# Patient Record
Sex: MALE | Race: WHITE | NOT HISPANIC OR LATINO | Employment: FULL TIME | URBAN - NONMETROPOLITAN AREA
[De-identification: names, ages, dates, MRNs, and addresses within clinical notes are randomized per-mention and may not be internally consistent; named-entity substitution may affect disease eponyms.]

---

## 2018-01-23 ENCOUNTER — OFFICE VISIT CONVERTED (OUTPATIENT)
Dept: FAMILY MEDICINE CLINIC | Age: 28
End: 2018-01-23
Attending: NURSE PRACTITIONER

## 2018-08-02 ENCOUNTER — OFFICE VISIT CONVERTED (OUTPATIENT)
Dept: FAMILY MEDICINE CLINIC | Age: 28
End: 2018-08-02
Attending: NURSE PRACTITIONER

## 2021-05-18 NOTE — PROGRESS NOTES
Ignacio Ceja 1990     Office/Outpatient Visit    Visit Date: Thu, Aug 2, 2018 02:17 pm    Provider: Melissa Perez N.P. (Assistant: Fatoumata Monahan LPN)    Location: [facility]        Electronically signed by Melissa Perez N.P. on  2018 02:43:08 PM                             SUBJECTIVE:        CC:     Jose is a 28 year old White male.  productive cough with clear/white sputum for about 1 month         HPI:         Jose presents with upper respiratory illness.  These have been present for the past 4 weeks.  The symptoms include cough and post nasal drip.  He denies fever.  He has already tried to relieve the symptoms with decongestants ( Sudafed ), Robitussin, and Amoxicillin called in by telehealth.      ROS:     CONSTITUTIONAL:  Negative for chills and fever.      EYES:  Negative for blurred vision and eye drainage.      E/N/T:  Positive for post nasal drip.   Negative for ear pain or sore throat.      CARDIOVASCULAR:  Negative for chest pain and palpitations.      RESPIRATORY:  Positive for recent cough.   Negative for dyspnea or frequent wheezing.          PMH/FMH/SH:     Last Reviewed on 2018 03:55 PM by Ruba Pelaez    Past Medical History:             PAST MEDICAL HISTORY     UNREMARKABLE         Surgical History:     NONE         Family History:     Father: Healthy     Mother: Healthy     Sister(s): Healthy; 1 sister(s) total     Paternal Grandfather: Healthy     Paternal Grandmother:      Maternal Grandfather:      Maternal Grandmother:          Social History:     Occupation: [employer]      Marital Status: Single     Children: None         Tobacco/Alcohol/Supplements:     Last Reviewed on 2018 03:49 PM by Leona Wallace    Tobacco: He has never smoked.              Current Problems:     Attention deficit disorder, without hyperactivity         Immunizations:     None        Allergies:     Last Reviewed on 2018 02:20 PM by  Fatoumata Monahan April      No Known Drug Allergies.         Current Medications:     Last Reviewed on 8/02/2018 02:20 PM by Fatoumata Monahan April    None        OBJECTIVE:        Vitals:         Current: 8/2/2018 2:19:54 PM    Ht:  5 ft, 6.75 in;  Wt: 149.4 lbs;  BMI: 23.6    T: 97.5 F (oral);  BP: 100/70 mm Hg (left arm, sitting);  P: 59 bpm (left arm (BP Cuff), sitting)    O2 Sat: 99 % (room air)        Exams:     PHYSICAL EXAM:     GENERAL: well developed, well nourished;  no apparent distress;     EYES: PERRL, EOMI     E/N/T: EARS: external auditory canal normal;  bilateral TMs are normal;  NOSE: normal turbinates; no sinus tenderness; OROPHARYNX: oral mucosa is normal; posterior pharynx shows no exudate and post nasal drip;     NECK: range of motion is normal; trachea is midline;     RESPIRATORY: normal respiratory rate and pattern with no distress; normal breath sounds with no rales, rhonchi, wheezes or rubs;     CARDIOVASCULAR: normal rate; rhythm is regular;     MUSCULOSKELETAL: normal gait;     NEUROLOGIC: mental status: alert and oriented x 3; GROSSLY INTACT     PSYCHIATRIC: appropriate affect and demeanor;         ASSESSMENT           466.0   J20.9  Acute bronchitis              DDx:         ORDERS:         Meds Prescribed:       Prednisone 10mg Tablet take 6 pills today, 5 pills tomorrow, 4 pills day 3, 3 pills day 4, 2 pills day 5 and 1 pill day 6  #21 (Twenty One) tablet(s) Refills: 0       Bromfed-DM (Brompheniramine/Dextromethorphan/Pseudoephedrine) Syrup 1  to 2  tsp po every 4-6 hrs prn cough/congestion.  #240 ([address] [place] [address] [place]) ml Refills: 0                 PLAN:          Acute bronchitis         RECOMMENDATIONS given include: Push Fluids, Rest, Follow up if no improvement or worsening symptoms like high fevers, vomiting, weakness, or increasing shortness of air.    .      FOLLOW-UP: Schedule follow-up appointments on a p.r.n. basis. Chronic visit follow up           Prescriptions: Will  order Tessalon perles if Bromfed DM too expensive. Pt will call back.       Prednisone 10mg Tablet take 6 pills today, 5 pills tomorrow, 4 pills day 3, 3 pills day 4, 2 pills day 5 and 1 pill day 6  #21 (Twenty One) tablet(s) Refills: 0       Bromfed-DM (Brompheniramine/Dextromethorphan/Pseudoephedrine) Syrup 1  to 2  tsp po every 4-6 hrs prn cough/congestion.  #240 ([address] [place] [address]) ml Refills: 0           Patient Education Handouts:       Acute Bronchitis              Patient Recommendations:        For  Acute bronchitis:     Schedule follow-up appointments as needed.              CHARGE CAPTURE           **Please note: ICD descriptions below are intended for billing purposes only and may not represent clinical diagnoses**        Primary Diagnosis:         466.0 Acute bronchitis            J20.9    Acute bronchitis, unspecified              Orders:          76217   Office/outpatient visit; established patient, level 3  (In-House)

## 2021-05-18 NOTE — PROGRESS NOTES
"Ignacio Ceja 1990     Office/Outpatient Visit    Visit Date: Tue, Jan 23, 2018 03:45 pm    Provider: Ruba Pelaez N.P. (Assistant: Leona Wallace MA)    Location: [facility]        Electronically signed by Ruba Pelaez N.P. on  01/23/2018 04:53:37 PM                             SUBJECTIVE:        CC:     Mr. Ceja is a 27 year old White male.  This is his first visit to the clinic.  EST.CARE, [facility]         HPI:         HEALTH RISK PROFILE (\"At Risk\" items are starred):         Smoking: Life-long non-smoker;     Cancer Screening: Performs regular testicular exams;     Immunization Status: unknown;     Nutrition: does not follow any type of diet;     Physical Activity: ** Never exercises;     Alcohol/Drug Use: Rarely drinks alcohol; No illicit drug use;     Safety: Always wears seatbelt;     ROS:     CONSTITUTIONAL:  Negative for chills, fatigue, fever, and weight change.      EYES:  Positive for decreased vision at times with distance and near.      E/N/T:  Negative for hearing problems, E/N/T pain, congestion, rhinorrhea, epistaxis, hoarseness, and dental problems.      CARDIOVASCULAR:  Negative for chest pain, palpitations, tachycardia, orthopnea, and edema.      RESPIRATORY:  Negative for cough, dyspnea, and hemoptysis.      GASTROINTESTINAL:  Negative for abdominal pain, heartburn, constipation, diarrhea, and stool changes.      GENITOURINARY:  Negative for dysuria, genital lesions, hematuria, impotence, polyuria, and changes in urine stream.      MUSCULOSKELETAL:  Negative for arthralgias, back pain, and myalgias.      INTEGUMENTARY:  Negative for atypical moles, dry skin, pruritis, and rashes.      NEUROLOGICAL:  Negative for dizziness, headaches, paresthesias, and weakness.      HEMATOLOGIC/LYMPHATIC:  Negative for easy bruising, bleeding, and lymphadenopathy.      ENDOCRINE:  Negative for hair loss, heat/cold intolerance, polydipsia, and polyphagia.      " ALLERGIC/IMMUNOLOGIC:  Negative for allergies, frequent illnesses, HIV exposure, and urticaria.      PSYCHIATRIC:  Negative for anxiety, depression, and sleep disturbances.          PMH/FMH/SH:     Last Reviewed on 2018 03:55 PM by Ruba Pelaez    Past Medical History:             PAST MEDICAL HISTORY     UNREMARKABLE         Surgical History:     NONE         Family History:     Father: Healthy     Mother: Healthy     Sister(s): Healthy; 1 sister(s) total     Paternal Grandfather: Healthy     Paternal Grandmother:      Maternal Grandfather:      Maternal Grandmother:          Social History:     Occupation: [place]      Marital Status: Single     Children: None         Tobacco/Alcohol/Supplements:     Last Reviewed on 2018 03:49 PM by Leona Wallace    Tobacco: He has never smoked.              Immunizations:     None        Allergies:     Last Reviewed on 2018 03:48 PM by Leona Wallace      No Known Drug Allergies.         Current Medications:     Last Reviewed on 2018 03:49 PM by Leona Wallace    None        OBJECTIVE:        Vitals:         Current: 2018 3:47:56 PM    Ht:  5 ft, 6.75 in;  Wt: 151.4 lbs;  BMI: 23.9    T: 97.5 F (oral);  BP: 126/72 mm Hg (left arm, sitting);  P: 74 bpm (left arm (BP Cuff), sitting)        Exams:     PHYSICAL EXAM:     GENERAL: Vitals recorded well developed, well nourished;  well groomed;  no apparent distress;     EYES: lids and lacrimal system are normal in appearance; extraocular movements intact; conjunctiva and cornea are normal; PERRLA;     E/N/T:  normal EACs, TMs, nasal/oral mucosa, teeth, gingiva, and oropharynx;     NECK:  supple, full ROM; no thyromegaly; no carotid bruits;     RESPIRATORY: normal respiratory rate and pattern with no distress; normal breath sounds with no rales, rhonchi, wheezes or rubs;     CARDIOVASCULAR: normal rate; rhythm is regular;  no systolic murmur; no edema;      GASTROINTESTINAL: nontender, nondistended; no hepatosplenomegaly or masses; no bruits;     LYMPHATIC: no enlargement of cervical or facial nodes;     SKIN:  no significant rashes or lesions; no suspicious moles;     MUSCULOSKELETAL:  Normal range of motion, strength and tone;     NEUROLOGIC: GROSSLY INTACT     PSYCHIATRIC:  appropriate affect and demeanor; normal speech pattern; grossly normal memory;         Lab/Test Results:     AUDIO AND VISUAL SCREENING     Vision Testing: Near Right 20/20 Left 20/20 Bilateral 20/20;  Far Right 20/15 Left 20/15 Bilateral 20/13;         ASSESSMENT           V70.0   Z00.00  Annual exam              DDx:         ORDERS:         Procedures Ordered:       [id number]  Screening test of visual acuity, quantitative, bilateral  (In-House)                   PLAN:          Annual exam reviewed labs with patient and to work on his exercise and diet in regards to his low HDL         COUNSELING was provided today regarding the following topics: healthy eating habits, regular exercise, testicular self-exam, use of seat belts, and ADVISED TO SEE AN EYE DOCTOR AND A DENTIST REGULARLY.      FOLLOW-UP: Schedule follow-up appointments on a p.r.n. basis.            Orders:       [id number]  Screening test of visual acuity, quantitative, bilateral  (In-House)             Patient Education Handouts:       Physical Exam 20-29 year, Male              Patient Recommendations:        For  Annual exam:     Limit dietary intake of fat (especially saturated fat) and cholesterol.  Eat a variety of foods, including plenty of fruits, vegetables, and grain containg fiber, limit fat intake to 30% of total calories. Balance caloric intake with energy expended. Maintaining regular physical activity is advised to help prevent heart disease, hypertension, diabetes, and obesity.    Testicular cancer is the #1 cancer in men ages 15-35.  You should regularly examine your testicles for knots, lumps, or tenderness. Testicular pain,  even if not associated with any masses, needs to be evaluated by your doctor! Always use shoulder/lap restraints when driving or riding in a vehicle, even those equipped with air bags.  Schedule follow-up appointments as needed.              CHARGE CAPTURE           **Please note: ICD descriptions below are intended for billing purposes only and may not represent clinical diagnoses**        Primary Diagnosis:         V70.0 Annual exam            Z00.00    Encounter for general adult medical examination without abnormal findings              Orders:          [id number]   Preventive medicine, new patient, age 18-39 years  (In-House)             15892   Screening test of visual acuity, quantitative, bilateral  (In-House)

## 2021-07-01 VITALS
HEART RATE: 59 BPM | TEMPERATURE: 97.5 F | SYSTOLIC BLOOD PRESSURE: 100 MMHG | HEIGHT: 67 IN | WEIGHT: 149.4 LBS | DIASTOLIC BLOOD PRESSURE: 70 MMHG | BODY MASS INDEX: 23.45 KG/M2 | OXYGEN SATURATION: 99 %

## 2021-07-01 VITALS
HEART RATE: 74 BPM | TEMPERATURE: 97.5 F | HEIGHT: 67 IN | DIASTOLIC BLOOD PRESSURE: 72 MMHG | SYSTOLIC BLOOD PRESSURE: 126 MMHG | BODY MASS INDEX: 23.76 KG/M2 | WEIGHT: 151.4 LBS

## 2021-11-25 ENCOUNTER — E-VISIT (OUTPATIENT)
Dept: FAMILY MEDICINE CLINIC | Facility: TELEHEALTH | Age: 31
End: 2021-11-25

## 2021-11-26 NOTE — E-VISIT ESCALATED
Patient escalated   Provider Sonali Cortes chose to escalate patient to another level of care because: Insufficient information to diagnose   Patient was sent the following message:   Based on the information you've provided us, you need to take another step to get care. Please sign up for a video visit tomorrow. I need more information to better decide what course of action to take. We do not start new psychological mental health medication thru virtual care. i am not sure why your psychiatrist can't prescribe this for you. If you do a video visit tomorrow we can discuss your options. Sonali FRAIRE   What to do now:    Please set up a video visit  .   You won't be charged for your eVisit. If you paid with a credit card, the charge will be reversed.   Chief Complaint: Anxiety, Depression, Stress   Patient introduction   Patient is 31-year-old male presenting with mood symptoms. Patient reports experiencing current symptoms for less than a year. Patient is willing to try medication as part of their treatment plan.   Patient-submitted comments explaining reason for visit: I've been seeing a therapist for a few weeks now and he says I need to try Wellbutrin due to my depression, seasonal depression, minor anxiety, and current difficulty concentrating on homework and other tasks(I have a past diagnosis of ADHD, I've been off my adderal for over 15 years)..   Patient did not request an excuse note.   Reports recent unusual stress relating to personal relationships and work.   Depression screening   PHQ-9. Response options are: Not at all (0), On several days (1), More than half the days (2), or Nearly every day (3).   Over the past 2 weeks, patient has been bothered:    (3) Nearly every day by having little interest or pleasure in doing things    (3) Nearly every day by depressed mood    (2) On more than half the days by sleep disturbance    (2) On more than half the days by fatigue or lethargy    (2) On more  than half the days by change in appetite    (0) Not at all by feelings of worthlessness or excessive guilt    (3) Nearly every day by poor concentration    (0) Not at all by observable restlessness or slowness in movement    (0) Not at all by thoughts of hurting themselves or that they'd be better off dead   Reports that the above problems have made it very difficult to work, function at home, or get along with other people.   Score: 15. Interpretation: 0-4: None to minimal. 5-9: Mild depression. 10-14: Major Depressive Disorder, Mild. 15-19: Major Depressive Disorder, Moderately Severe. 20-27: Major Depressive Disorder, Severe.   Anxiety screening   ANA-7. Response options are: Not at all (0), On several days (1), More than half the days (2), or Nearly every day (3)   Over the past 2 weeks, patient has been bothered:    (2) On more than half the days by feeling nervous, anxious, or on edge    (2) On more than half the days by not being able to stop or control worrying    (2) On more than half the days by worrying too much about different things    (2) On more than half the days by having trouble relaxing    (0) Not at all by being so restless that it's hard to sit still    (0) Not at all by becoming easily annoyed or irritable    (1) On several days by feeling afraid, as if something awful might happen   Reports that the above problems have made it somewhat difficult to work, function at home, or get along with other people.   Score: 9. Interpretation: 0-4: None to minimal. 5-9: Mild anxiety. 10-14: Moderate anxiety. 15-21: Severe anxiety.   Suicide risk screening   Score: Negative screen (based on PHQ-9 responses above).   Action taken based on risk:    Negative screen: Patient completed interview.    Low risk: Patient completed interview. Follow-up per provider discretion.    Moderate risk: Recommended to call the National Suicide Prevention Lifeline, 911, or go to their nearest ER. Patient given option to  continue with the interview if those options are not relevant at this time. Follow-up per provider discretion.    High risk: Recommended to go to ER, call 911, or call the National Suicide Prevention Lifeline. Patient given option to continue with the interview if those options are not relevant at this time.   Repetitive thoughts and behaviors screening   DSM-5 Level 1 Cross-Cutting Symptom Measure, Section X. 2 items. Response options are: Not at all (0), Rarely (1), Several days (2), More than half the days (3), or Nearly every day (4)   Over the past 2 weeks, patient has been bothered:    (0) Not at all by unpleasant thoughts, urges, or images that repeatedly enter their mind    (0) Not at all by feeling driven to repeat certain behaviors or mental acts   Score: 0. Interpretation: 0-2 (with 0-1 on both items): Negative screen. >= 2 (with >= 2 on either item): Positive screen.   Janette/hypomania screening   DSM-5 Level 1 Cross-Cutting Symptom Measure, Section III. 2 items. Response options are: Not at all (0), Rarely (1), Several days (2), More than half the days (3), or Nearly every day (4)   Over the past 2 weeks, patient has been bothered:    (0) Not at all by sleeping less than usual, but still having a lot of energy    (0) Not at all by starting lots more projects than usual or doing more risky things than usual   Score: 0. Interpretation: 0-2 (with <= 1 on both items): Negative screen. >= 2 (with >= 2 on at least 1 item): Positive screen; in-interview follow-up with Jannette Self-Rating Janette (ASRM) Scale.   Psychosis/hallucination screening   DSM-5 Level 1 Cross-Cutting Symptom Measure, Section VII. 2 items. Response options are: Not at all (0), Rarely (1), Several days (2), More than half the days (3), or Nearly every day (4)   Over the past 2 weeks, patient has been bothered:    (0) Not at all by hearing things other people couldn't hear    (0) Not at all by feeling that someone could hear their thoughts    Score: 0. Interpretation: 0: Negative screen. 1 or higher: Positive screen.   Substance abuse screening   DSM-5 Level 1 Cross-Cutting Symptom Measure, Section XIII. 3 items on use of alcohol, tobacco, recreational drugs, or prescription medications beyond the amount prescribed or duration of prescription.   Over the past 2 weeks, patient:    (0) Denies having at least 4 alcoholic drinks on any single day    (0) Denies using tobacco    (0) Denies using a recreational or prescription drug on their own   Score: 0. Interpretation: 0 is a negative screen. 1 or higher with positive response for prescription/recreational drug abuse leads to follow-up with Level 2 Cross-Cutting Symptom Measure, Section XIII. 1 or higher with positive response for alcohol leads to follow-up with AUDIT-C. 1 or higher with positive response for tobacco use leads to tobacco cessation advice in AVS.   Comorbid/Exacerbating conditions   Denies history of asthma, cancer, chronic pain, congestive heart failure, coronary artery disease, diabetes, epilepsy, hypertension, inflammatory arthritis, kidney disease or history of kindey function problems, lupus, multiple sclerosis, Parkinson disease, thyroid disorder, and viral hepatitis.   Past mental health history   Reports previous diagnosis of depression. Regarding previous diagnosis of mental health condition(s) not listed, patient writes: ADHD?.   Family history of mental health disorders   Reports family history of generalized anxiety disorder.   Current mental health treatment   Patient is not currently taking medication for any mental health condition. Patient is currently in counseling or therapy. Patient is currently being seen by a psychiatrist.   Previous mental health treatment   Patient has not taken medication for any mental health condition in the past.   Current medications   Denies taking other medications or supplements.   Medication allergies   None.   Medication contraindications    None.   Assessment:   Patient determined to need a level of care not appropriate to be delivered through eVisit.   Plan:   Patient informed of need to seek in-person care      ----------   Electronically signed by JUNO Huynh on 2021-11-25 at 20:41PM   ----------   Patient Interview Transcript:   Have you recently experienced unusual stress from any of these? Select all that apply.    Personal relationships    Work   Not selected:    Home situation    Family    Finances    Something related to COVID-19    Current news and events    None of the above   Have you ever been diagnosed with any of these mental health conditions? Select all that apply.    Depression    A mental health condition not listed here (specify): ADHD?   Not selected:    Generalized anxiety disorder (ANA)    Panic attacks    Post traumatic stress disorder (PTSD)    Obsessive-compulsive disorder (OCD)    Bipolar disorder    Schizophrenia or schizoaffective disorder    None of the above   Are you currently taking medication for any mental health condition? Select one.    No   Not selected:    Yes   Have you taken medication for any mental health condition in the past? Select one.    No   Not selected:    Yes   Are you currently in counseling or therapy? Select one.    Yes   Not selected:    No   Are you currently being seen by a psychiatrist, or have you been seen by a psychiatrist in the last 2 years? Select one.    Yes, currently   Not selected:    Yes, within the last 2 years    No   Do you have any of these medical conditions? Scroll to see all options. Select all that apply.    None of the above   Not selected:    Asthma    Cancer    Chronic pain    Congestive heart failure    Coronary artery disease (blocked arteries in the heart)    Diabetes    Epilepsy    High blood pressure    Inflammatory arthritis    Kidney disease or history of kidney function problems    Lupus (SLE)    Multiple sclerosis    Parkinson disease    Thyroid disorder     Viral hepatitis   Has anyone in your family had any of these? Select all that apply.    Generalized anxiety disorder (ANA)   Not selected:    Depression    Panic attacks    Post traumatic stress disorder (PTSD)    Obsessive-compulsive disorder (OCD)    Bipolar disorder    Schizophrenia or schizoaffective disorder    Drug or alcohol addiction (substance use disorder)     by suicide    Attempted suicide    No, not that I know of   1. Over the past 2 weeks, how often have you been bothered by: Having little interest or pleasure in doing things Select one.    Nearly every day   Not selected:    Not at all    Several days    More than half the days   2. Over the past 2 weeks, how often have you been bothered by: Feeling down, depressed, or hopeless Select one.    Nearly every day   Not selected:    Not at all    Several days    More than half the days   3. Over the past 2 weeks, how often have you been bothered by: Trouble falling or staying asleep, or sleeping too much Select one.    More than half the days   Not selected:    Not at all    Several days    Nearly every day   4. Over the past 2 weeks, how often have you been bothered by: Feeling tired or having little energy Select one.    More than half the days   Not selected:    Not at all    Several days    Nearly every day   5. Over the past 2 weeks, how often have you been bothered by: Poor appetite or overeating Select one.    More than half the days   Not selected:    Not at all    Several days    Nearly every day   6. Over the past 2 weeks, how often have you been bothered by: Feeling bad about yourself, that you're a failure, or that you've let yourself or friends and family down Select one.    Not at all   Not selected:    Several days    More than half the days    Nearly every day   7. Over the past 2 weeks, how often have you been bothered by: Trouble concentrating on things like watching TV or reading the news Select one.    Nearly every day   Not  selected:    Not at all    Several days    More than half the days   8. Over the past 2 weeks, how often have you been bothered by: Moving or speaking so slowly that other people could have noticed OR Being so fidgety or restless that you have been moving around a lot more than usual Select one.    Not at all   Not selected:    Several days    More than half the days    Nearly every day   9. Over the past 2 weeks, how often have you been bothered by: Thoughts that you'd be better off dead or thoughts of hurting yourself Select one.    Not at all   Not selected:    Several days    More than half the days    Nearly every day   How difficult have these problems made it for you to work, take care of things at home, or get along with other people? Select one.    Very difficult   Not selected:    Not difficult at all    Somewhat difficult    Extremely difficult   1. Over the past 2 weeks, how often have you been bothered by: Feeling nervous, anxious, or on edge? Select one.    More than half the days   Not selected:    Not at all    Several days    Nearly every day   2. Over the past 2 weeks, how often have you been bothered by: Not being able to stop or control worrying? Select one.    More than half the days   Not selected:    Not at all    Several days    Nearly every day   3. Over the past 2 weeks, how often have you been bothered by: Worrying too much about different things? Select one.    More than half the days   Not selected:    Not at all    Several days    Nearly every day   4. Over the past 2 weeks, how often have you been bothered by: Having trouble relaxing? Select one.    More than half the days   Not selected:    Not at all    Several days    Nearly every day   5. Over the past 2 weeks, how often have you been bothered by: Being so restless that it's hard to sit still? Select one.    Not at all   Not selected:    Several days    More than half the days    Nearly every day   6. Over the past 2 weeks, how  often have you been bothered by: Becoming easily annoyed or irritable? Select one.    Not at all   Not selected:    Several days    More than half the days    Nearly every day   7. Over the past 2 weeks, how often have you been bothered by: Feeling afraid, as if something awful might happen? Select one.    Several days   Not selected:    Not at all    More than half the days    Nearly every day   How difficult have these symptoms made it for you to do your work, take care of things at home, or get along with other people? Select one.    Somewhat difficult   Not selected:    Not difficult at all    Very difficult    Extremely difficult   Over the past 2 weeks, how often have you been bothered by: Sleeping less than usual, but still having a lot of energy? Select one.    Not at all   Not selected:    1 to 2 days    Several days    More than half the days    Nearly every day   Over the past 2 weeks, how often have you been bothered by: Starting lots more projects than usual or doing more risky things than usual? Select one.    Not at all   Not selected:    1 to 2 days    Several days    More than half the days    Nearly every day   Over the past 2 weeks, how often have you been bothered by: Hearing things other people couldn't hear, such as voices even when no one was around? Select one.    Not at all   Not selected:    1 to 2 days    Several days    More than half the days    Nearly every day   Over the past 2 weeks, how often have you been bothered by: Feeling that someone could hear your thoughts, or that you could hear what another person was thinking? Select one.    Not at all   Not selected:    1 to 2 days    Several days    More than half the days    Nearly every day   Over the past 2 weeks, how often have you been bothered by: Unpleasant thoughts, urges, or images that repeatedly enter your mind? Select one.    Not at all   Not selected:    1 to 2 days    Several days    More than half the days    Nearly every  "day   Over the past 2 weeks, how often have you been bothered by: Feeling driven to perform certain behaviors or mental acts over and over again? Select one.    Not at all   Not selected:    1 to 2 days    Several days    More than half the days    Nearly every day   Over the past 2 weeks, how often did you: Have at least 4 drinks of any kind of alcohol in a single day? Select one.    Not at all   Not selected:    1 to 2 days    Several days    More than half the days    Nearly every day   Over the past 2 weeks, how often have you: Smoked any cigarettes, smoked a cigar or pipe, or used snuff or chewing tobacco? Select one.    Not at all   Not selected:    1 to 2 days    Several days    More than half the days    Nearly every day   Over the past 2 weeks, how often did you use any of these medicines on your own? \"On your own\" means without a doctor's prescription, or more than prescribed, or longer than prescribed. - Prescription painkillers, such as Vicodin - Stimulants, such as Ritalin or Adderall - Sedatives or tranquilizers, such as sleeping pills or Valium - Marijuana - Cocaine or crack - Club drugs, such as Ecstasy - Hallucinogens, such as LSD - Heroin - Inhalants or solvents, such as glue - Methamphetamines, such as speed Select one.    Not at all   Not selected:    1 to 2 days    Several days    More than half the days    Nearly every day   Think about all of the symptoms you've shared with us today. How long have you been feeling this way? Select one.    Less than a year   Not selected:    More than a year    I'm not sure   These last few questions help us make sure your treatment plan is safe for you. Do you have any of these conditions? Select all that apply.    None of these   Not selected:    Uncorrected or persistent electrolyte abnormalities, such as potassium, sodium, calcium or magnesium    QT prolongation    Congenital long QT syndrome (LQTS)    Ventricular arrhythmias, such as ventricular " "fibrillation or ventricular tachycardia    Bradycardia (low heart rate)    Recent heart attack    Congestive heart failure (CHF)    Brugada syndrome   Do any of these apply to you now or in the recent past? \"Cold turkey\" here means stopping a medication suddenly rather than slowly taking lower and lower doses until you're off the medication. Select all that apply.    None of these   Not selected:    Seizure disorder    Bulimia or anorexia    Liver disease    Alcohol abuse    Stopped using alcohol \"cold turkey\"    Stopped using a sedative \"cold turkey\"    Stopped using an anti-seizure drug \"cold turkey\"    Stopped using a benzodiazepine drug (Klonopin, Valium, Ativan, Xanax) \"cold turkey\"   Are you currently taking any of these medications? Select all that apply.    None of these   Not selected:    MAO inhibitor in the last 14 days    Linezolid or IV methylene blue    Pimozide    Thioridazine   Are you taking any other medications or supplements? On the next screen, you need to list all vitamins, supplements, non-prescription medications (such as aspirin or Aleve), and prescription medications that you're taking. Select one.    No   Not selected:    Yes    Yes, but I'm not sure what they are   Have you ever had an allergic or bad reaction to any medication? Select one.    No   Not selected:    Yes   If medication is recommended as part of your treatment plan, is that something you're willing to try? Select one.    Yes   Not selected:    No   Do you need a doctor's note? A doctor's note confirms that you received care today and states when you can return to school or work. It does not contain information about your diagnosis or treatment plan. Your provider will make the final decision on whether to give you a doctor's note. Doctor's notes CANNOT be backdated. Select one.    No   Not selected:    Today only (1 day)    Today and tomorrow (2 days)    3 days   What is the main reason you're taking this interview today?   "  I've been seeing a therapist for a few weeks now and he says I need to try Wellbutrin due to my depression, seasonal depression, minor anxiety, and current difficulty concentrating on homework and other tasks(I have a past diagnosis of ADHD, I've been off my adderal for over 15 years).   ----------   Medical history   Medical history data does not currently exist for this patient.

## 2022-07-14 ENCOUNTER — OFFICE VISIT (OUTPATIENT)
Dept: FAMILY MEDICINE CLINIC | Age: 32
End: 2022-07-14

## 2022-07-14 VITALS
TEMPERATURE: 98.6 F | WEIGHT: 142 LBS | DIASTOLIC BLOOD PRESSURE: 70 MMHG | SYSTOLIC BLOOD PRESSURE: 118 MMHG | HEART RATE: 68 BPM | HEIGHT: 67 IN | OXYGEN SATURATION: 99 % | BODY MASS INDEX: 22.29 KG/M2

## 2022-07-14 DIAGNOSIS — R41.840 IMPAIRED CONCENTRATION: Primary | ICD-10-CM

## 2022-07-14 PROCEDURE — 99202 OFFICE O/P NEW SF 15 MIN: CPT | Performed by: NURSE PRACTITIONER

## 2022-07-14 NOTE — ASSESSMENT & PLAN NOTE
Referral to LEFTY Mccormack, reviewed his EMD chart and note by his pediatrician re his ADHD and Ritalin LA rx in the past (2008); discussed other treatment options, but recommended he see a mental health specialist

## 2022-07-14 NOTE — PROGRESS NOTES
Ignacio Ceja presents to [facility].    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[employer], accounting, wants rx for ADHD now  to help  Had a bad break up last year and had SAD, took rx (wellbutrin)  three months, It helped, but then stopped, went on 150 then 300 and then stopped  He has one week of summer classes left, then has a one month break in his school schedule)     PAST MEDICAL HISTORY changes since  when last seen at our office :       Covid +-      Surgical History/hospitalizations:     NONE         Family History:       Father: CAD    Mother: Healthy     Sister(s): Healthy; 1 sister(s) total     Paternal Grandfather: Healthy     Paternal Grandmother:      Maternal Grandfather:      Maternal Grandmother:          Social History:     Occupation: [place]  /[employer]    Marital Status: Single     Children: None       Review of Systems:  Review of Systems   Constitutional: Negative for fatigue and fever.   Respiratory: Negative for cough and shortness of breath.    Cardiovascular: Negative for chest pain, palpitations and leg swelling.   Neurological: Negative for numbness.   Psychiatric/Behavioral: Positive for decreased concentration. Negative for depressed mood. The patient is not nervous/anxious.         No current outpatient medications on file.    Vital Signs:   Vitals:    22 1504   BP: 118/70   BP Location: Right arm  "  Patient Position: Sitting   Pulse: 68   Temp: 98.6 °F (37 °C)   TempSrc: Oral   SpO2: 99%  Comment: room air   Weight: 64.4 kg (142 lb)   Height: 169.5 cm (66.73\")         Physical Exam:  Physical Exam  Vitals reviewed.   Constitutional:       General: He is not in acute distress.     Appearance: Normal appearance.   Cardiovascular:      Rate and Rhythm: Normal rate and regular rhythm.      Heart sounds: Normal heart sounds. No murmur heard.  Pulmonary:      Effort: Pulmonary effort is normal. No respiratory distress.      Breath sounds: Normal breath sounds.   Neurological:      Mental Status: He is alert.   Psychiatric:         Mood and Affect: Mood normal.         Behavior: Behavior normal.         Result Review   {Labs  Result Review  Imaging  Med Tab  Media  Procedures :23}   The following data was reviewed by: JUNO Castellanos on 07/14/2022:    No results found for this or any previous visit.            Assessment and Plan:          Diagnoses and all orders for this visit:    1. Impaired concentration (Primary)  Assessment & Plan:  Referral to LEFTY Mccormack, reviewed his EMD chart and note by his pediatrician re his ADHD and Ritalin LA rx in the past (2008); discussed other treatment options, but recommended he see a mental health specialist     Orders:  -     Ambulatory Referral to Psychiatry        Follow Up   Return for follow up and establish with a PCP .  Patient was given instructions and counseling regarding his condition or for health maintenance advice. Please see specific information pulled into the AVS if appropriate.       "

## 2022-09-01 NOTE — PROGRESS NOTES
"Subjective   Ignacio Ceja is a 32 y.o. male who presents today for initial evaluation     Referring Provider:  Ruba Pelaez, APRN  [address] [facility]  [address]  [place] [address]    Chief Complaint: Impaired concentration    History of Present Illness:     9/1: Chart review: Seen by primary care July 14 for concentration issues.  Seen by a pediatrics in the past, treated with Ritalin by Dr. Yanes.  Also on Adderall in the past.  Patient wanted a refill of Ritalin LA 40 mg capsules daily.  Patient stopped this treatment after high school.  Now going to school again and wants treatment for ADHD.  Had a bad break-up last year and experiencing depression, took Wellbutrin for 3 months, went up to 300 mg a day, and then stopped it.  Patient lives in [place].  PDMP is blank.  No Care Everywhere.  No labs, head imaging, EKG.    \"Jose\"    9/2: In person.  Interview:  1. Chart review: Need a urine drug screen.  2. His/Her Story: \"It started back before I can remember.\"  a. P0, G0  b. I was put on a whole trial of stuff, 6th grade-feliciano  i. Ritalin LA  ii. adderall xr 30 daily: needed to take early in the am.  iii. Dexedrine  iv. strattera  v. Unclear which one helped; pt thought adderall, charts indicate Ritalin  c. Took himself off meds due to appetite suppression  d. Notes fatigue after work and poor concentration  i. Needs a nap every day  3. Depression/Mood: denies but has a hx  a. Seasonal pattern: def  b. Severity: Moderate  c. Duration: once in the past  4. Anxiety: denies  5. Panic attacks: n  6. ADHD: dx'd as a child  a. Elementary school: can't recall grades, but no failures, [employer] student  b. Fhx: denies  c. Presently: Problems with attention for detail, sustained attention issues, cannot listen when spoken to directly, cannot finish tasks, avoids tasks that require sustained mental effort, easily distracted, forgetting things, losing things, problems organizing, talking a lot and cutting " people off.  d. PTSD: denies   7. Psych ROS: Positive for depression once (more of an adj d/o with depressed mood).  Negative for psychosis and mandie.  a. ADHD: y  b. PTSD: neg  8. No SI HI AVH.  9. Substances: denies  10. Therapy: had a therapist during his break up, no need for now  11. Medication compliant: y    Access to Firearms: denies    PHQ-9 Depression Screening  PHQ-9 Total Score:  0    Little interest or pleasure in doing things?     Feeling down, depressed, or hopeless?     Trouble falling or staying asleep, or sleeping too much?     Feeling tired or having little energy?     Poor appetite or overeating?     Feeling bad about yourself - or that you are a failure or have let yourself or your family down?     Trouble concentrating on things, such as reading the newspaper or watching television?     Moving or speaking so slowly that other people could have noticed? Or the opposite - being so fidgety or restless that you have been moving around a lot more than usual?     Thoughts that you would be better off dead, or of hurting yourself in some way?     PHQ-9 Total Score       ANA-7  Feeling nervous, anxious or on edge: Not at all  Not being able to stop or control worrying: Not at all  Worrying too much about different things: Not at all  Trouble Relaxing: Not at all  Being so restless that it is hard to sit still: Not at all  Feeling afraid as if something awful might happen: Not at all  Becoming easily annoyed or irritable: Not at all  ANA 7 Total Score: 0  If you checked any problems, how difficult have these problems made it for you to do your work, take care of things at home, or get along with other people: Not difficult at all    Past Surgical History:  History reviewed. No pertinent surgical history.    Problem List:  Patient Active Problem List   Diagnosis   • Impaired concentration       Allergy:   No Known Allergies     Discontinued Medications:  There are no discontinued medications.    Current  "Medications:   No current outpatient medications on file.     No current facility-administered medications for this visit.       Past Medical History:  History reviewed. No pertinent past medical history.    Past Psychiatric History:  Began Treatment: as a child  Diagnoses: adhd, and what sounds like adj d/o with depressed mood  Psychiatrist:Teodoro  Therapist: once, last year  Admission History: denies    Medication Trials:    Wellbutrin XL: stopped working after a while 150, 300 (increase didn't help). Stopped it because he started feeling tired all the time. Then he got better, back to his old self, so stopped.    Self Harm: Denies  Suicide Attempts:Denies   Psychosis, Anxiety, Depression: Denies    Substance Abuse History:   Types:Denies all, including illicit  Withdrawal Symptoms:Denies  Longest Period Sober:Not Applicable   AA: Not applicable     Social History:  Martial Status:Single  Employed:Yes  Kids:No  House: apt   History: Denies    Social History     Socioeconomic History   • Marital status: Single   Tobacco Use   • Smoking status: Never Smoker   • Smokeless tobacco: Never Used   Vaping Use   • Vaping Use: Never used   Substance and Sexual Activity   • Alcohol use: Yes     Comment: OCCASIONAL/SOCIAL   • Drug use: Never   • Sexual activity: Yes     Partners: Female     Birth control/protection: Condom       Family History:   Suicide Attempts: Denies  Suicide Completions:Denies      History reviewed. No pertinent family history.    Developmental History:       Childhood: Denies Abuse  High School:Completed  College: some college, got AD, now back in college, accounting    · Mental Status Exam  · Appearance  · : groomed, good eye contact, normal street clothes  · Behavior  · : pleasant and cooperative  · Motor  · : No abnormal  · Speech  · :normal rhythm, rate, volume, tone, not hyperverbal, not pressured, normal prosidy  · Mood  · : \"fine\"  · Affect  · : euthymic, mood congruent, good " "variability  · Thought Content  · : negative suicidal ideations, negative homicidal ideations, negative obsessions  · Perceptions  · : negative auditory hallucinations, negative visual hallucinations  · Thought Process  · : linear  · Insight/Judgement  · : Fair/fair  · Cognition  · : grossly intact  · Attention   : intact    Review of Systems:  Review of Systems   Constitutional: Positive for fatigue. Negative for diaphoresis and fever.   HENT: Negative for drooling.    Eyes: Negative for visual disturbance.   Respiratory: Negative for cough, chest tightness and shortness of breath.    Cardiovascular: Negative for chest pain, palpitations and leg swelling.   Gastrointestinal: Negative for diarrhea, nausea and vomiting.   Endocrine: Negative for cold intolerance and heat intolerance.   Genitourinary: Negative for difficulty urinating.   Musculoskeletal: Negative for joint swelling.   Allergic/Immunologic: Negative for immunocompromised state.   Neurological: Negative for dizziness, seizures, speech difficulty, light-headedness, numbness and headaches.       Physical Exam:  Physical Exam    Vital Signs:   Ht 168.9 cm (66.5\")   BMI 22.58 kg/m²      Lab Results:   No visits with results within 6 Month(s) from this visit.   Latest known visit with results is:   No results found for any previous visit.       EKG Results:  No orders to display       Imaging Results:  No Images in the past 120 days found..      Assessment & Plan   Diagnoses and all orders for this visit:    1. ADHD (attention deficit hyperactivity disorder), inattentive type (Primary)  -     Urine Drug Screen - Urine, Clean Catch; Future    2. Impaired concentration        Visit Diagnoses:    ICD-10-CM ICD-9-CM   1. ADHD (attention deficit hyperactivity disorder), inattentive type  F90.0 314.00   2. Impaired concentration  R41.840 799.51     9/2: Start adderall after clean UDS. 6 wks    PLAN:  12. Safety: No acute safety concerns  13. Therapy: " None  14. Risk Assessment: Risk of self-harm acutely is low to moderate.  Risk factors include history of adjustment disorder, and recent psychosocial stressors (pandemic). Protective factors include no family history, denies access to guns/weapons, no present SI, no history of suicide attempts or self-harm in the past, minimal AODA, healthcare seeking, future orientation, willingness to engage in care.  Risk of self-harm chronically is also low to moderate, but could be further elevated in the event of treatment noncompliance and/or AODA.  15. Meds:  a. START Adderall XR 15 mg daily after clean UDS. Risks, benefits, side effects discussed with patient including elevated heart rate, elevated blood pressure, irritability, insomnia, sexual dysfunction, appetite suppressing properties, psychosis.  After discussion of these risks and benefits, the patient voiced understanding and agreed to proceed. UDS ordered, Eleni reviewed.  16. Labs: UDS now  17. Follow up: 6 weeks    Patient screened positive for depression based on a PHQ-9 score of 0 on 7/14/2022. Follow-up recommendations include: Suicide Risk Assessment performed.           TREATMENT PLAN/GOALS: Continue supportive psychotherapy efforts and medications as indicated. Treatment and medication options discussed during today's visit. Patient acknowledged and verbally consented to continue with current treatment plan and was educated on the importance of compliance with treatment and follow-up appointments.    MEDICATION ISSUES:  ELENI reviewed as expected.  Discussed medication options and treatment plan of prescribed medication as well as the risks, benefits, and side effects including potential falls, possible impaired driving and metabolic adversities among others. Patient is agreeable to call the office with any worsening of symptoms or onset of side effects. Patient is agreeable to call 911 or go to the nearest ER should he/she begin having SI/HI. No  medication side effects or related complaints today.     MEDS ORDERED DURING VISIT:  No orders of the defined types were placed in this encounter.      Return in about 6 weeks (around 10/14/2022).         This document has been electronically signed by Juan Mendosa MD  September 2, 2022 09:23 EDT    Dictated Utilizing Dragon Dictation: Part of this note may be an electronic transcription/translation of spoken language to printed text using the Dragon Dictation System.

## 2022-09-01 NOTE — PATIENT INSTRUCTIONS
1.  Please return to clinic at your next scheduled visit.  Contact the clinic ([phone]) at least 24 hours prior in the event you need to cancel.  2.  Do no harm to yourself or others.    3.  Avoid alcohol and drugs.    4.  Take all medications as prescribed.  Please contact the clinic with any concerns. If you are in need of medication refills, please call the clinic at [phone].    5. Should you want to get in touch with your provider, Dr. Juan Mendosa, please utilize [employer] or contact the office ([phone]), and staff will be able to page Dr. Mendosa directly.  6.  In the event you have personal crisis, contact the following crisis numbers: Suicide Prevention Hotline 1-737.825.4557; [facility] Helpline 3-227-466-FBSL; [facility] Emergency Room [phone]; text HELLO to 886387; or 514.     SPECIFIC RECOMMENDATIONS:     1.      Medications discussed at this encounter:                   - start adderall xr 15     2.      Psychotherapy recommendations:      3.     Return to clinic: 6 weeks

## 2022-09-02 ENCOUNTER — TELEMEDICINE (OUTPATIENT)
Dept: PSYCHIATRY | Facility: CLINIC | Age: 32
End: 2022-09-02

## 2022-09-02 ENCOUNTER — TELEPHONE (OUTPATIENT)
Dept: PSYCHIATRY | Facility: CLINIC | Age: 32
End: 2022-09-02

## 2022-09-02 ENCOUNTER — LAB (OUTPATIENT)
Dept: LAB | Facility: HOSPITAL | Age: 32
End: 2022-09-02

## 2022-09-02 VITALS — HEIGHT: 67 IN | BODY MASS INDEX: 22.58 KG/M2

## 2022-09-02 DIAGNOSIS — F90.0 ADHD (ATTENTION DEFICIT HYPERACTIVITY DISORDER), INATTENTIVE TYPE: Primary | ICD-10-CM

## 2022-09-02 DIAGNOSIS — R41.840 IMPAIRED CONCENTRATION: ICD-10-CM

## 2022-09-02 DIAGNOSIS — F90.0 ADHD (ATTENTION DEFICIT HYPERACTIVITY DISORDER), INATTENTIVE TYPE: ICD-10-CM

## 2022-09-02 LAB
AMPHET+METHAMPHET UR QL: NEGATIVE
BARBITURATES UR QL SCN: NEGATIVE
BENZODIAZ UR QL SCN: NEGATIVE
CANNABINOIDS SERPL QL: NEGATIVE
COCAINE UR QL: NEGATIVE
METHADONE UR QL SCN: NEGATIVE
OPIATES UR QL: NEGATIVE
OXYCODONE UR QL SCN: NEGATIVE

## 2022-09-02 PROCEDURE — 80307 DRUG TEST PRSMV CHEM ANLYZR: CPT

## 2022-09-02 PROCEDURE — 90792 PSYCH DIAG EVAL W/MED SRVCS: CPT | Performed by: STUDENT IN AN ORGANIZED HEALTH CARE EDUCATION/TRAINING PROGRAM

## 2022-09-02 NOTE — TREATMENT PLAN
Multi-Disciplinary Problems (from Behavioral Health Treatment Plan)    Active Problems     Problem: ADHD (Adult)  Start Date: 09/02/22    Problem Details: The patient self-scales this problem as a 7 with 10 being the worst.      Goal Priority Start Date Expected End Date End Date    Patient will sustain attention and concentration to complete chores, and work responsibilites and increase positive interaction in all relationships. -- 09/02/22 -- --    Goal Details: Progress toward goal:  Not appropriate to rate progress toward goal since this is the initial treatment plan.      Goal Intervention Frequency Start Date End Date    Assist patient in setting responsible goals and breaking down large tasks. Q Month 09/02/22 --    Intervention Details: Duration of treatment until until remission of symptoms.      Goal Intervention Frequency Start Date End Date    Assist patient in using self monitoring checklist to improve attention, work performance, and social skills. Q Month 09/02/22 --    Intervention Details: Duration of treatment until until remission of symptoms.                  Reviewed By     Juan Mendosa MD 09/02/22 0984                 I have discussed and reviewed this treatment plan with the patient.

## 2022-09-06 DIAGNOSIS — F90.0 ADHD (ATTENTION DEFICIT HYPERACTIVITY DISORDER), INATTENTIVE TYPE: Primary | ICD-10-CM

## 2022-09-06 RX ORDER — DEXTROAMPHETAMINE SACCHARATE, AMPHETAMINE ASPARTATE MONOHYDRATE, DEXTROAMPHETAMINE SULFATE AND AMPHETAMINE SULFATE 3.75; 3.75; 3.75; 3.75 MG/1; MG/1; MG/1; MG/1
15 CAPSULE, EXTENDED RELEASE ORAL EVERY MORNING
Qty: 30 CAPSULE | Refills: 0 | Status: SHIPPED | OUTPATIENT
Start: 2022-09-06 | End: 2022-10-04 | Stop reason: SDUPTHER

## 2022-10-04 DIAGNOSIS — F90.0 ADHD (ATTENTION DEFICIT HYPERACTIVITY DISORDER), INATTENTIVE TYPE: ICD-10-CM

## 2022-10-04 RX ORDER — DEXTROAMPHETAMINE SACCHARATE, AMPHETAMINE ASPARTATE MONOHYDRATE, DEXTROAMPHETAMINE SULFATE AND AMPHETAMINE SULFATE 3.75; 3.75; 3.75; 3.75 MG/1; MG/1; MG/1; MG/1
15 CAPSULE, EXTENDED RELEASE ORAL EVERY MORNING
Qty: 30 CAPSULE | Refills: 0 | Status: SHIPPED | OUTPATIENT
Start: 2022-10-06 | End: 2022-11-03 | Stop reason: SDUPTHER

## 2022-10-20 ENCOUNTER — OFFICE VISIT (OUTPATIENT)
Dept: PSYCHIATRY | Facility: CLINIC | Age: 32
End: 2022-10-20

## 2022-10-20 VITALS
HEIGHT: 67 IN | BODY MASS INDEX: 22.54 KG/M2 | SYSTOLIC BLOOD PRESSURE: 129 MMHG | HEART RATE: 75 BPM | DIASTOLIC BLOOD PRESSURE: 79 MMHG | WEIGHT: 143.6 LBS

## 2022-10-20 DIAGNOSIS — F90.0 ADHD (ATTENTION DEFICIT HYPERACTIVITY DISORDER), INATTENTIVE TYPE: Primary | ICD-10-CM

## 2022-10-20 PROCEDURE — 99213 OFFICE O/P EST LOW 20 MIN: CPT | Performed by: STUDENT IN AN ORGANIZED HEALTH CARE EDUCATION/TRAINING PROGRAM

## 2022-10-20 RX ORDER — DEXTROAMPHETAMINE SACCHARATE, AMPHETAMINE ASPARTATE, DEXTROAMPHETAMINE SULFATE AND AMPHETAMINE SULFATE 2.5; 2.5; 2.5; 2.5 MG/1; MG/1; MG/1; MG/1
10 TABLET ORAL DAILY
Qty: 17 TABLET | Refills: 0 | Status: SHIPPED | OUTPATIENT
Start: 2022-10-20 | End: 2022-11-03 | Stop reason: SDUPTHER

## 2022-10-20 NOTE — PATIENT INSTRUCTIONS
1.  Please return to clinic at your next scheduled visit.  Contact the clinic ([phone]) at least 24 hours prior in the event you need to cancel.  2.  Do no harm to yourself or others.    3.  Avoid alcohol and drugs.    4.  Take all medications as prescribed.  Please contact the clinic with any concerns. If you are in need of medication refills, please call the clinic at [phone].    5. Should you want to get in touch with your provider, Dr. Juan Mendosa, please utilize [employer] or contact the office ([phone]), and staff will be able to page Dr. Mendosa directly.  6.  In the event you have personal crisis, contact the following crisis numbers: Suicide Prevention Hotline 1-484.724.9899; [facility] Helpline 5-341-904-GOOV; [facility] Emergency Room [phone]; text HELLO to 747004; or 567.     SPECIFIC RECOMMENDATIONS:     1.      Medications discussed at this encounter:                   - start booster dose     2.      Psychotherapy recommendations:      3.     Return to clinic: 6 weeks

## 2022-10-20 NOTE — PROGRESS NOTES
"Subjective   Ignacio Ceja is a 32 y.o. male who presents today for initial evaluation     Referring Provider:  Ruba Pelaez, APRN  [address]  [address]  [place] [address]    Chief Complaint: Impaired concentration    History of Present Illness:     9/1: Chart review: Seen by primary care July 14 for concentration issues.  Seen by a pediatrics in the past, treated with Ritalin by Dr. Yanes.  Also on Adderall in the past.  Patient wanted a refill of Ritalin LA 40 mg capsules daily.  Patient stopped this treatment after high school.  Now going to school again and wants treatment for ADHD.  Had a bad break-up last year and experiencing depression, took Wellbutrin for 3 months, went up to 300 mg a day, and then stopped it.  Patient lives in [place].  PDMP is blank.  No Care Everywhere.  No labs, head imaging, EKG.    \"Jose\"    10/20: In person interview:  1. Chart review: No new.  2. Planning: Started Adderall at last visit.  3. \"Overall it went well.\"  a. ADHD: had to experiment when to take it. Eventually settled on 9-9:30 am.  i. Effects: lasts about 8 hours, wears off before he has finished with his homework  ii. 75% it works for concentration  4. Mood/Depression: n  5. Anxiety: n  6. Energy: normal energy, much better than it was  7. Concentration:  8. Sleeping: well  9. Eating: essentially stable since 7/22  10. Refills: y  11. Substances: n  12. Therapy: n  13. Medication compliant: y  14. No SI HI AVH.      9/2: In person.  Interview:  15. Chart review: Need a urine drug screen.  16. His/Her Story: \"It started back before I can remember.\"  a. P0, G0  b. I was put on a whole trial of stuff, 6th grade-[employer]  i. Ritalin LA  ii. adderall xr 30 daily: needed to take early in the am.  iii. Dexedrine  iv. strattera  v. Unclear which one helped; pt thought adderall, charts indicate Ritalin  c. Took himself off meds due to appetite suppression  d. Notes fatigue after work and poor " concentration  i. Needs a nap every day  17. Depression/Mood: denies but has a hx  a. Seasonal pattern: def  b. Severity: Moderate  c. Duration: once in the past  18. Anxiety: denies  19. Panic attacks: n  20. ADHD: dx'd as a child  a. Elementary school: can't recall grades, but no failures, [employer] student  b. Fhx: denies  c. Presently: Problems with attention for detail, sustained attention issues, cannot listen when spoken to directly, cannot finish tasks, avoids tasks that require sustained mental effort, easily distracted, forgetting things, losing things, problems organizing, talking a lot and cutting people off.  d. PTSD: denies   21. Psych ROS: Positive for depression once (more of an adj d/o with depressed mood).  Negative for psychosis and mandie.  a. ADHD: y  b. PTSD: neg  22. No SI HI AVH.  23. Substances: denies  24. Therapy: had a therapist during his break up, no need for now  25. Medication compliant: y    Access to Firearms: denies    PHQ-9 Depression Screening  PHQ-9 Total Score: 00    Little interest or pleasure in doing things? 0-->not at all   Feeling down, depressed, or hopeless? 0-->not at all   Trouble falling or staying asleep, or sleeping too much?     Feeling tired or having little energy?     Poor appetite or overeating?     Feeling bad about yourself - or that you are a failure or have let yourself or your family down?     Trouble concentrating on things, such as reading the newspaper or watching television?     Moving or speaking so slowly that other people could have noticed? Or the opposite - being so fidgety or restless that you have been moving around a lot more than usual?     Thoughts that you would be better off dead, or of hurting yourself in some way?     PHQ-9 Total Score 0     ANA-7  Feeling nervous, anxious or on edge: Not at all  Not being able to stop or control worrying: Not at all  Worrying too much about different things: Not at all  Trouble Relaxing: Not at all  Being  so restless that it is hard to sit still: Not at all  Feeling afraid as if something awful might happen: Not at all  Becoming easily annoyed or irritable: Several days  ANA 7 Total Score: 1  If you checked any problems, how difficult have these problems made it for you to do your work, take care of things at home, or get along with other people: Not difficult at all    Past Surgical History:  Past Surgical History:   Procedure Laterality Date   • CIRCUMCISION         Problem List:  Patient Active Problem List   Diagnosis   • Impaired concentration       Allergy:   No Known Allergies     Discontinued Medications:  There are no discontinued medications.    Current Medications:   Current Outpatient Medications   Medication Sig Dispense Refill   • amphetamine-dextroamphetamine XR (Adderall XR) 15 MG 24 hr capsule Take 1 capsule by mouth Every Morning 30 capsule 0   • amphetamine-dextroamphetamine (Adderall) 10 MG tablet Take 1 tablet by mouth Daily. 17 tablet 0     No current facility-administered medications for this visit.       Past Medical History:  History reviewed. No pertinent past medical history.    Past Psychiatric History:  Began Treatment: as a child  Diagnoses: adhd, and what sounds like adj d/o with depressed mood  Psychiatrist:Denies  Therapist: once, last year  Admission History: denies    Medication Trials:    Wellbutrin XL: stopped working after a while 150, 300 (increase didn't help). Stopped it because he started feeling tired all the time. Then he got better, back to his old self, so stopped.    Self Harm: Denies  Suicide Attempts:Denies   Psychosis, Anxiety, Depression: Denies    Substance Abuse History:   Types:Denies all, including illicit  Withdrawal Symptoms:Denies  Longest Period Sober:Not Applicable   AA: Not applicable     Social History:  Martial Status:Single  Employed:Yes  Kids:No  House: apt   History: Denies    Social History     Socioeconomic History   • Marital  "status: Single   Tobacco Use   • Smoking status: Never   • Smokeless tobacco: Never   Vaping Use   • Vaping Use: Never used   Substance and Sexual Activity   • Alcohol use: Yes     Comment: OCCASIONAL/SOCIAL   • Drug use: Never   • Sexual activity: Yes     Partners: Female     Birth control/protection: Condom       Family History:   Suicide Attempts: Denies  Suicide Completions:Denies      History reviewed. No pertinent family history.    Developmental History:       Childhood: Denies Abuse  High School:Completed  College: some college, got AD, now back in college, accounting    · Mental Status Exam  · Appearance  · : groomed, good eye contact, normal street clothes  · Behavior  · : pleasant and cooperative  · Motor  · : No abnormal  · Speech  · :normal rhythm, rate, volume, tone, not hyperverbal, not pressured, normal prosidy  · Mood  · : \"it helps\"  · Affect  · : euthymic, mood congruent, good variability  · Thought Content  · : negative suicidal ideations, negative homicidal ideations, negative obsessions  · Perceptions  · : negative auditory hallucinations, negative visual hallucinations  · Thought Process  · : linear  · Insight/Judgement  · : Fair/fair  · Cognition  · : grossly intact  · Attention   : intact    Review of Systems:  Review of Systems   Constitutional: Negative for diaphoresis, fatigue and fever.   HENT: Negative for drooling.    Eyes: Negative for visual disturbance.   Respiratory: Negative for cough, chest tightness and shortness of breath.    Cardiovascular: Negative for chest pain, palpitations and leg swelling.   Gastrointestinal: Negative for diarrhea, nausea and vomiting.   Endocrine: Positive for cold intolerance and heat intolerance.   Genitourinary: Negative for difficulty urinating.   Musculoskeletal: Negative for joint swelling.   Allergic/Immunologic: Negative for immunocompromised state.   Neurological: Negative for dizziness, seizures, syncope, speech difficulty, light-headedness, " "numbness and headaches.       Physical Exam:  Physical Exam    Vital Signs:   /79   Pulse 75   Ht 170.2 cm (67\")   Wt 65.1 kg (143 lb 9.6 oz)   BMI 22.49 kg/m²      Lab Results:   Lab on 09/02/2022   Component Date Value Ref Range Status   • Amphet/Methamphet, Screen 09/02/2022 Negative  Negative Final   • Barbiturates Screen, Urine 09/02/2022 Negative  Negative Final   • Benzodiazepine Screen, Urine 09/02/2022 Negative  Negative Final   • Cocaine Screen, Urine 09/02/2022 Negative  Negative Final   • Opiate Screen 09/02/2022 Negative  Negative Final   • THC, Screen, Urine 09/02/2022 Negative  Negative Final   • Methadone Screen, Urine 09/02/2022 Negative  Negative Final   • Oxycodone Screen, Urine 09/02/2022 Negative  Negative Final       EKG Results:  No orders to display       Imaging Results:  No Images in the past 120 days found..      Assessment & Plan   Diagnoses and all orders for this visit:    1. ADHD (attention deficit hyperactivity disorder), inattentive type (Primary)  -     amphetamine-dextroamphetamine (Adderall) 10 MG tablet; Take 1 tablet by mouth Daily.  Dispense: 17 tablet; Refill: 0        Visit Diagnoses:    ICD-10-CM ICD-9-CM   1. ADHD (attention deficit hyperactivity disorder), inattentive type  F90.0 314.00     10/20: Start booster dose. CSA signed. 6 wks.     9/2: Start adderall after clean UDS. 6 wks    PLAN:  26. Safety: No acute safety concerns  27. Therapy: None  28. Risk Assessment: Risk of self-harm acutely is low to moderate.  Risk factors include history of adjustment disorder, and recent psychosocial stressors (pandemic). Protective factors include no family history, denies access to guns/weapons, no present SI, no history of suicide attempts or self-harm in the past, minimal AODA, healthcare seeking, future orientation, willingness to engage in care.  Risk of self-harm chronically is also low to moderate, but could be further elevated in the event of treatment " noncompliance and/or AODA.  29. Meds:  a. CONTINUE Adderall XR 15 mg daily. START adderall 10 mg qnoon. Risks, benefits, side effects discussed with patient including elevated heart rate, elevated blood pressure, irritability, insomnia, sexual dysfunction, appetite suppressing properties, psychosis.  After discussion of these risks and benefits, the patient voiced understanding and agreed to proceed. UDS ordered, Eleni reviewed.  30. Labs: UDS neg  31. Follow up: 6 weeks    Patient screened positive for depression based on a PHQ-9 score of 0 on 10/20/2022. Follow-up recommendations include: Suicide Risk Assessment performed.           TREATMENT PLAN/GOALS: Continue supportive psychotherapy efforts and medications as indicated. Treatment and medication options discussed during today's visit. Patient acknowledged and verbally consented to continue with current treatment plan and was educated on the importance of compliance with treatment and follow-up appointments.    MEDICATION ISSUES:  ELENI reviewed as expected.  Discussed medication options and treatment plan of prescribed medication as well as the risks, benefits, and side effects including potential falls, possible impaired driving and metabolic adversities among others. Patient is agreeable to call the office with any worsening of symptoms or onset of side effects. Patient is agreeable to call 911 or go to the nearest ER should he/she begin having SI/HI. No medication side effects or related complaints today.     MEDS ORDERED DURING VISIT:  New Medications Ordered This Visit   Medications   • amphetamine-dextroamphetamine (Adderall) 10 MG tablet     Sig: Take 1 tablet by mouth Daily.     Dispense:  17 tablet     Refill:  0     Booster dose at noon (together with adderall xr 15). Thanks!       Return in about 6 weeks (around 12/1/2022).         This document has been electronically signed by Juan Mendosa MD  October 20, 2022 15:18 EDT    Dictated Utilizing  Dragon Dictation: Part of this note may be an electronic transcription/translation of spoken language to printed text using the Dragon Dictation System.

## 2022-11-03 DIAGNOSIS — F90.0 ADHD (ATTENTION DEFICIT HYPERACTIVITY DISORDER), INATTENTIVE TYPE: ICD-10-CM

## 2022-11-03 RX ORDER — DEXTROAMPHETAMINE SACCHARATE, AMPHETAMINE ASPARTATE, DEXTROAMPHETAMINE SULFATE AND AMPHETAMINE SULFATE 2.5; 2.5; 2.5; 2.5 MG/1; MG/1; MG/1; MG/1
10 TABLET ORAL DAILY
Qty: 30 TABLET | Refills: 0 | Status: SHIPPED | OUTPATIENT
Start: 2022-11-06 | End: 2022-12-05 | Stop reason: SDUPTHER

## 2022-11-03 RX ORDER — DEXTROAMPHETAMINE SACCHARATE, AMPHETAMINE ASPARTATE MONOHYDRATE, DEXTROAMPHETAMINE SULFATE AND AMPHETAMINE SULFATE 3.75; 3.75; 3.75; 3.75 MG/1; MG/1; MG/1; MG/1
15 CAPSULE, EXTENDED RELEASE ORAL EVERY MORNING
Qty: 30 CAPSULE | Refills: 0 | Status: SHIPPED | OUTPATIENT
Start: 2022-11-06 | End: 2022-12-05 | Stop reason: SDUPTHER

## 2022-11-03 NOTE — TELEPHONE ENCOUNTER
Patient needs a refill.  Order pended  LAST PRESCRIPTION FOR IMMEDIATE RELEASE WAS ONLY 17 DAYS TO MATCH UP WITH THE TIME FRAME FOR THE EXTENDED RELEASE FORMULA SO THEY CAN BE FILLED AT THE SAME TIME

## 2022-11-03 NOTE — TELEPHONE ENCOUNTER
"FAX RECEIVED FROM PT'S PHARMACY IN REGARDS TO ADDERALL 10MG. PHARMACY LEFT THE FOLLOWING COMMENT \"THIS IS STILL ON BACKORDER---PLEASE EITHER SEND TO OTHER PHARMACY OR SEND ALTERNATE MEDICATION.\" WILL CALL PT BACK TO NOTIFY OF THIS AND TO DISCUSS PHARMACY OPTIONS OR POSSIBLE MEDICATION CHANGE WITH PROVIDER.   "

## 2022-11-03 NOTE — TELEPHONE ENCOUNTER
CALLED PT TO RELAY THE FOLLOWING INFORMATION IN REGARDS TO ADDERALL 10MG. PT DID NOT ANSWER, LVMTCB TO INQUIRE ABOUT NEXT STEPS.

## 2022-12-05 ENCOUNTER — TELEPHONE (OUTPATIENT)
Dept: PSYCHIATRY | Facility: CLINIC | Age: 32
End: 2022-12-05

## 2022-12-05 DIAGNOSIS — F90.0 ADHD (ATTENTION DEFICIT HYPERACTIVITY DISORDER), INATTENTIVE TYPE: ICD-10-CM

## 2022-12-05 RX ORDER — DEXTROAMPHETAMINE SACCHARATE, AMPHETAMINE ASPARTATE MONOHYDRATE, DEXTROAMPHETAMINE SULFATE AND AMPHETAMINE SULFATE 3.75; 3.75; 3.75; 3.75 MG/1; MG/1; MG/1; MG/1
15 CAPSULE, EXTENDED RELEASE ORAL EVERY MORNING
Qty: 30 CAPSULE | Refills: 0 | Status: SHIPPED | OUTPATIENT
Start: 2022-12-06 | End: 2023-01-06 | Stop reason: SDUPTHER

## 2022-12-05 RX ORDER — DEXTROAMPHETAMINE SACCHARATE, AMPHETAMINE ASPARTATE, DEXTROAMPHETAMINE SULFATE AND AMPHETAMINE SULFATE 2.5; 2.5; 2.5; 2.5 MG/1; MG/1; MG/1; MG/1
10 TABLET ORAL DAILY
Qty: 30 TABLET | Refills: 0 | Status: SHIPPED | OUTPATIENT
Start: 2022-12-09 | End: 2023-01-06 | Stop reason: SDUPTHER

## 2022-12-05 NOTE — TELEPHONE ENCOUNTER
CONTROLLED MEDICATION REFILL REQUEST    STATE REGULATION APPT EVERY 3 MONTHS     UDS(URINE DRUG SCREEN) EVERY 6 MONTHS     NEW NARC CONSENT EVERY YEAR      URINE DRUG SCREEN: Urine Drug Screen - Urine, Clean Catch (09/02/2022 11:12)    LAST OFFICE VISIT: Office Visit with Juan Mendosa MD (10/20/2022)      NARC CONSENT: CONTROLLED SUBSTANCE AGREEMENT - SCAN - CONTROLLED SUBSTANCE CONTRACT, , 10/20/2022 (10/26/2022)     NEXT OFFICE VISIT: Appointment with Juan Mendosa MD (12/13/2022)     MEDICATION:   amphetamine-dextroamphetamine XR (Adderall XR) 15 MG 24 hr capsule (11/06/2022)    amphetamine-dextroamphetamine (Adderall) 10 MG tablet (11/06/2022)    PROVIDER PLEASE ADVISE     THANK YOU

## 2022-12-13 ENCOUNTER — TELEMEDICINE (OUTPATIENT)
Dept: PSYCHIATRY | Facility: CLINIC | Age: 32
End: 2022-12-13

## 2022-12-13 DIAGNOSIS — R41.840 IMPAIRED CONCENTRATION: ICD-10-CM

## 2022-12-13 DIAGNOSIS — F90.0 ADHD (ATTENTION DEFICIT HYPERACTIVITY DISORDER), INATTENTIVE TYPE: Primary | ICD-10-CM

## 2022-12-13 PROCEDURE — 99213 OFFICE O/P EST LOW 20 MIN: CPT | Performed by: STUDENT IN AN ORGANIZED HEALTH CARE EDUCATION/TRAINING PROGRAM

## 2022-12-13 NOTE — PROGRESS NOTES
"Subjective   Ignacio Ceja is a 32 y.o. male who presents today for initial evaluation     Referring Provider:  Ruba Pelaez, APRN  [address] [facility]  [address]  [place] [address]    Chief Complaint: Impaired concentration    History of Present Illness:     9/1: Chart review: Seen by primary care July 14 for concentration issues.  Seen by a pediatrics in the past, treated with Ritalin by Dr. Yanes.  Also on Adderall in the past.  Patient wanted a refill of Ritalin LA 40 mg capsules daily.  Patient stopped this treatment after high school.  Now going to school again and wants treatment for ADHD.  Had a bad break-up last year and experiencing depression, took Wellbutrin for 3 months, went up to 300 mg a day, and then stopped it.  Patient lives in [place].  PDMP is blank.  No Care Everywhere.  No labs, head imaging, EKG.    \"Jose\"    12/13: In person interview:  1. Chart review: No new.  2. Planning: Started booster dose at last visit.  3. \"It was good.\"  a. ADHD: Significantly better than it was.  Patient feels he is in a good place.  4. Mood/Depression: Denies  5. Anxiety: Denies  6. Panic attacks: No  7. Energy: Good  8. Concentration: ADHD under control  9. Sleeping: Well  10. Eating: Stable weight  11. Refills: Yes  12. Substances: Denies  13. Therapy: No  14. Medication compliant: Yes  15. No SI HI AVH.      10/20: In person interview:  16. Chart review: No new.  17. Planning: Started Adderall at last visit.  18. \"Overall it went well.\"  a. ADHD: had to experiment when to take it. Eventually settled on 9-9:30 am.  i. Effects: lasts about 8 hours, wears off before he has finished with his homework  ii. 75% it works for concentration  19. Mood/Depression: n  20. Anxiety: n  21. Energy: normal energy, much better than it was  22. Concentration:  23. Sleeping: well  24. Eating: essentially stable since 7/22  25. Refills: y  26. Substances: n  27. Therapy: n  28. Medication compliant: y  29. No " "SI HI AVH.      9/2: In person.  Interview:  30. Chart review: Need a urine drug screen.  31. His/Her Story: \"It started back before I can remember.\"  a. P0, G0  b. I was put on a whole trial of stuff, 6th grade-feliciano  i. Ritalin LA  ii. adderall xr 30 daily: needed to take early in the am.  iii. Dexedrine  iv. strattera  v. Unclear which one helped; pt thought adderall, charts indicate Ritalin  c. Took himself off meds due to appetite suppression  d. Notes fatigue after work and poor concentration  i. Needs a nap every day  32. Depression/Mood: denies but has a hx  a. Seasonal pattern: def  b. Severity: Moderate  c. Duration: once in the past  33. Anxiety: denies  34. Panic attacks: n  35. ADHD: dx'd as a child  a. Elementary school: can't recall grades, but no failures, [employer] student  b. Fhx: denies  c. Presently: Problems with attention for detail, sustained attention issues, cannot listen when spoken to directly, cannot finish tasks, avoids tasks that require sustained mental effort, easily distracted, forgetting things, losing things, problems organizing, talking a lot and cutting people off.  d. PTSD: denies   36. Psych ROS: Positive for depression once (more of an adj d/o with depressed mood).  Negative for psychosis and mandie.  a. ADHD: y  b. PTSD: neg  37. No SI HI AVH.  38. Substances: denies  39. Therapy: had a therapist during his break up, no need for now  40. Medication compliant: y    Access to Firearms: denies    PHQ-9 Depression Screening  PHQ-9 Total Score:  0    Little interest or pleasure in doing things?     Feeling down, depressed, or hopeless?     Trouble falling or staying asleep, or sleeping too much?     Feeling tired or having little energy?     Poor appetite or overeating?     Feeling bad about yourself - or that you are a failure or have let yourself or your family down?     Trouble concentrating on things, such as reading the newspaper or watching television?     Moving or speaking so " slowly that other people could have noticed? Or the opposite - being so fidgety or restless that you have been moving around a lot more than usual?     Thoughts that you would be better off dead, or of hurting yourself in some way?     PHQ-9 Total Score       ANA-7       Past Surgical History:  Past Surgical History:   Procedure Laterality Date   • CIRCUMCISION         Problem List:  Patient Active Problem List   Diagnosis   • Impaired concentration       Allergy:   No Known Allergies     Discontinued Medications:  There are no discontinued medications.    Current Medications:   Current Outpatient Medications   Medication Sig Dispense Refill   • amphetamine-dextroamphetamine (Adderall) 10 MG tablet Take 1 tablet by mouth Daily for 30 days. 30 tablet 0   • amphetamine-dextroamphetamine XR (Adderall XR) 15 MG 24 hr capsule Take 1 capsule by mouth Every Morning 30 capsule 0     No current facility-administered medications for this visit.       Past Medical History:  History reviewed. No pertinent past medical history.    Past Psychiatric History:  Began Treatment: as a child  Diagnoses: adhd, and what sounds like adj d/o with depressed mood  Psychiatrist:Denies  Therapist: once, last year  Admission History: denies    Medication Trials:    Wellbutrin XL: stopped working after a while 150, 300 (increase didn't help). Stopped it because he started feeling tired all the time. Then he got better, back to his old self, so stopped.    Self Harm: Denies  Suicide Attempts:Denies   Psychosis, Anxiety, Depression: Denies    Substance Abuse History:   Types:Denies all, including illicit  Withdrawal Symptoms:Denies  Longest Period Sober:Not Applicable   AA: Not applicable     Social History:  Martial Status:Single  Employed:Yes  Kids:No  House: apt   History: Denies    Social History     Socioeconomic History   • Marital status: Single   Tobacco Use   • Smoking status: Never   • Smokeless tobacco: Never   Vaping  "Use   • Vaping Use: Never used   Substance and Sexual Activity   • Alcohol use: Yes     Comment: OCCASIONAL/SOCIAL   • Drug use: Never   • Sexual activity: Yes     Partners: Female     Birth control/protection: Condom       Family History:   Suicide Attempts: Denies  Suicide Completions:Denies      History reviewed. No pertinent family history.    Developmental History:       Childhood: Denies Abuse  High School:Completed  College: some college, got AD, now back in college, accounting    · Mental Status Exam  · Appearance  · : groomed, good eye contact, normal street clothes  · Behavior  · : pleasant and cooperative  · Motor  · : No abnormal  · Speech  · :normal rhythm, rate, volume, tone, not hyperverbal, not pressured, normal prosidy  · Mood  · : \"I am in a good place, yes\"  · Affect  · : euthymic, mood congruent, good variability  · Thought Content  · : negative suicidal ideations, negative homicidal ideations, negative obsessions  · Perceptions  · : negative auditory hallucinations, negative visual hallucinations  · Thought Process  · : linear  · Insight/Judgement  · : Fair/fair  · Cognition  · : grossly intact  · Attention   : intact    Review of Systems:  Review of Systems   Constitutional: Negative for diaphoresis, fatigue and fever.   HENT: Negative for drooling.    Eyes: Negative for visual disturbance.   Respiratory: Negative for cough, chest tightness and shortness of breath.    Cardiovascular: Negative for chest pain, palpitations and leg swelling.   Gastrointestinal: Negative for diarrhea, nausea and vomiting.   Endocrine: Negative for cold intolerance and heat intolerance.   Genitourinary: Negative for difficulty urinating.   Musculoskeletal: Negative for joint swelling.   Allergic/Immunologic: Negative for immunocompromised state.   Neurological: Negative for dizziness, seizures, syncope, speech difficulty, light-headedness, numbness and headaches.       Physical Exam:  Physical Exam    Vital Signs: "   There were no vitals taken for this visit.     Lab Results:   Lab on 09/02/2022   Component Date Value Ref Range Status   • Amphet/Methamphet, Screen 09/02/2022 Negative  Negative Final   • Barbiturates Screen, Urine 09/02/2022 Negative  Negative Final   • Benzodiazepine Screen, Urine 09/02/2022 Negative  Negative Final   • Cocaine Screen, Urine 09/02/2022 Negative  Negative Final   • Opiate Screen 09/02/2022 Negative  Negative Final   • THC, Screen, Urine 09/02/2022 Negative  Negative Final   • Methadone Screen, Urine 09/02/2022 Negative  Negative Final   • Oxycodone Screen, Urine 09/02/2022 Negative  Negative Final       EKG Results:  No orders to display       Imaging Results:  No Images in the past 120 days found..      Assessment & Plan   Diagnoses and all orders for this visit:    1. ADHD (attention deficit hyperactivity disorder), inattentive type (Primary)    2. Impaired concentration        Visit Diagnoses:    ICD-10-CM ICD-9-CM   1. ADHD (attention deficit hyperactivity disorder), inattentive type  F90.0 314.00   2. Impaired concentration  R41.840 799.51     12/13: Patient's ADHD is now under control.  3 months, UDS at that time    10/20: Start booster dose. CSA signed. 6 wks.     9/2: Start adderall after clean UDS. 6 wks    PLAN:  41. Safety: No acute safety concerns  42. Therapy: None  43. Risk Assessment: Risk of self-harm acutely is low to moderate.  Risk factors include history of adjustment disorder, and recent psychosocial stressors (pandemic). Protective factors include no family history, denies access to guns/weapons, no present SI, no history of suicide attempts or self-harm in the past, minimal AODA, healthcare seeking, future orientation, willingness to engage in care.  Risk of self-harm chronically is also low to moderate, but could be further elevated in the event of treatment noncompliance and/or AODA.  44. Meds:  a. CONTINUE Adderall XR 15 mg daily.  CONTINUE adderall 10 mg qnoon. Risks,  benefits, side effects discussed with patient including elevated heart rate, elevated blood pressure, irritability, insomnia, sexual dysfunction, appetite suppressing properties, psychosis.  After discussion of these risks and benefits, the patient voiced understanding and agreed to proceed. UDS ordered, Eleni reviewed.  45. Labs: UDS neg  46. Follow up: 3 months    Patient screened positive for depression based on a PHQ-9 score of 0 on 10/20/2022. Follow-up recommendations include: Suicide Risk Assessment performed.           TREATMENT PLAN/GOALS: Continue supportive psychotherapy efforts and medications as indicated. Treatment and medication options discussed during today's visit. Patient acknowledged and verbally consented to continue with current treatment plan and was educated on the importance of compliance with treatment and follow-up appointments.    MEDICATION ISSUES:  ELENI reviewed as expected.  Discussed medication options and treatment plan of prescribed medication as well as the risks, benefits, and side effects including potential falls, possible impaired driving and metabolic adversities among others. Patient is agreeable to call the office with any worsening of symptoms or onset of side effects. Patient is agreeable to call 911 or go to the nearest ER should he/she begin having SI/HI. No medication side effects or related complaints today.     MEDS ORDERED DURING VISIT:  No orders of the defined types were placed in this encounter.      Return in about 3 months (around 3/13/2023).         This document has been electronically signed by Juan Mendosa MD  December 13, 2022 15:23 EST    Dictated Utilizing Dragon Dictation: Part of this note may be an electronic transcription/translation of spoken language to printed text using the Dragon Dictation System.

## 2022-12-13 NOTE — PATIENT INSTRUCTIONS
1.  Please return to clinic at your next scheduled visit.  Contact the clinic ([phone]) at least 24 hours prior in the event you need to cancel.  2.  Do no harm to yourself or others.    3.  Avoid alcohol and drugs.    4.  Take all medications as prescribed.  Please contact the clinic with any concerns. If you are in need of medication refills, please call the clinic at [phone].    5. Should you want to get in touch with your provider, Dr. Juan Mendosa, please utilize [employer] or contact the office ([phone]), and staff will be able to page Dr. Mendosa directly.  6.  In the event you have personal crisis, contact the following crisis numbers: Suicide Prevention Hotline 1-932.622.5300; [facility] Helpline 3-480-327-QEWG; [facility] Emergency Room [phone]; text HELLO to 681718; or 497.     SPECIFIC RECOMMENDATIONS:     1.      Medications discussed at this encounter:                   -No changes     2.      Psychotherapy recommendations:      3.     Return to clinic: 3 months

## 2022-12-14 ENCOUNTER — TELEPHONE (OUTPATIENT)
Dept: PSYCHIATRY | Facility: CLINIC | Age: 32
End: 2022-12-14

## 2022-12-20 NOTE — TELEPHONE ENCOUNTER
PT HAS FOLLOW UP APPT WITH SAIRA ADHIKARI   60 f with  Diabetes type 1, DKA- Insulin, Endocrine follow  ESRD- continue HD,   CAD- s/p stents- stable.  HTn control  PVD stable  Vaginal bleeding-Gyn evaluation noted. No further work up in hospital . Follow as OTP  DC home when cleared by Endocrine service.  Pierce Viera MD pager [phone]

## 2023-01-06 DIAGNOSIS — F90.0 ADHD (ATTENTION DEFICIT HYPERACTIVITY DISORDER), INATTENTIVE TYPE: ICD-10-CM

## 2023-01-06 RX ORDER — DEXTROAMPHETAMINE SACCHARATE, AMPHETAMINE ASPARTATE MONOHYDRATE, DEXTROAMPHETAMINE SULFATE AND AMPHETAMINE SULFATE 3.75; 3.75; 3.75; 3.75 MG/1; MG/1; MG/1; MG/1
15 CAPSULE, EXTENDED RELEASE ORAL EVERY MORNING
Qty: 30 CAPSULE | Refills: 0 | Status: SHIPPED | OUTPATIENT
Start: 2023-01-08 | End: 2023-02-24 | Stop reason: SDUPTHER

## 2023-01-06 RX ORDER — DEXTROAMPHETAMINE SACCHARATE, AMPHETAMINE ASPARTATE, DEXTROAMPHETAMINE SULFATE AND AMPHETAMINE SULFATE 2.5; 2.5; 2.5; 2.5 MG/1; MG/1; MG/1; MG/1
10 TABLET ORAL DAILY
Qty: 30 TABLET | Refills: 0 | Status: SHIPPED | OUTPATIENT
Start: 2023-01-08 | End: 2023-02-07

## 2023-01-06 NOTE — TELEPHONE ENCOUNTER
PT LEFT MESSAGE WITH HUB FOR \"REFILL FOR ADDERALL.\"     PT HAS UPCOMING APPT ON 03/20/2023.    MEDICATION ORDERS PENDED FOR ADDERALL 10MG AND ADDERALL XR 15MG.    Detail Level: Generalized Quality 130: Documentation Of Current Medications In The Medical Record: Current Medications Documented

## 2023-02-24 DIAGNOSIS — F90.0 ADHD (ATTENTION DEFICIT HYPERACTIVITY DISORDER), INATTENTIVE TYPE: Primary | ICD-10-CM

## 2023-02-24 RX ORDER — DEXTROAMPHETAMINE SACCHARATE, AMPHETAMINE ASPARTATE MONOHYDRATE, DEXTROAMPHETAMINE SULFATE AND AMPHETAMINE SULFATE 3.75; 3.75; 3.75; 3.75 MG/1; MG/1; MG/1; MG/1
15 CAPSULE, EXTENDED RELEASE ORAL EVERY MORNING
Qty: 30 CAPSULE | Refills: 0 | Status: SHIPPED | OUTPATIENT
Start: 2023-02-24 | End: 2023-03-20 | Stop reason: SDUPTHER

## 2023-02-24 RX ORDER — DEXTROAMPHETAMINE SACCHARATE, AMPHETAMINE ASPARTATE, DEXTROAMPHETAMINE SULFATE AND AMPHETAMINE SULFATE 2.5; 2.5; 2.5; 2.5 MG/1; MG/1; MG/1; MG/1
10 TABLET ORAL DAILY
Qty: 30 TABLET | Refills: 0 | Status: SHIPPED | OUTPATIENT
Start: 2023-02-24 | End: 2023-03-20 | Stop reason: SDUPTHER

## 2023-02-24 NOTE — TELEPHONE ENCOUNTER
Pt called medication refill requests for Adderall XR 15mg and Adderall 10mg.     Pt has upcoming appt on 2023.     Medication order pended for Adderall XR 15mg to preferred pharmacy. Please reorder Adderall 10mg as well to same location as this order  off of active medication list. Please review.

## 2023-03-20 ENCOUNTER — TELEMEDICINE (OUTPATIENT)
Dept: PSYCHIATRY | Facility: CLINIC | Age: 33
End: 2023-03-20
Payer: OTHER GOVERNMENT

## 2023-03-20 DIAGNOSIS — F90.0 ADHD (ATTENTION DEFICIT HYPERACTIVITY DISORDER), INATTENTIVE TYPE: Primary | ICD-10-CM

## 2023-03-20 PROCEDURE — 99213 OFFICE O/P EST LOW 20 MIN: CPT | Performed by: STUDENT IN AN ORGANIZED HEALTH CARE EDUCATION/TRAINING PROGRAM

## 2023-03-20 RX ORDER — DEXTROAMPHETAMINE SACCHARATE, AMPHETAMINE ASPARTATE, DEXTROAMPHETAMINE SULFATE AND AMPHETAMINE SULFATE 2.5; 2.5; 2.5; 2.5 MG/1; MG/1; MG/1; MG/1
10 TABLET ORAL DAILY
Qty: 30 TABLET | Refills: 0 | Status: SHIPPED | OUTPATIENT
Start: 2023-04-06

## 2023-03-20 RX ORDER — DEXTROAMPHETAMINE SACCHARATE, AMPHETAMINE ASPARTATE MONOHYDRATE, DEXTROAMPHETAMINE SULFATE AND AMPHETAMINE SULFATE 5; 5; 5; 5 MG/1; MG/1; MG/1; MG/1
20 CAPSULE, EXTENDED RELEASE ORAL DAILY
Qty: 30 CAPSULE | Refills: 0 | Status: SHIPPED | OUTPATIENT
Start: 2023-03-26

## 2023-03-20 NOTE — PATIENT INSTRUCTIONS
1.  Please return to clinic at your next scheduled visit.  Contact the clinic ([phone]) at least 24 hours prior in the event you need to cancel.  2.  Do no harm to yourself or others.    3.  Avoid alcohol and drugs.    4.  Take all medications as prescribed.  Please contact the clinic with any concerns. If you are in need of medication refills, please call the clinic at [phone].    5. Should you want to get in touch with your provider, Dr. Juan Mendosa, please utilize [employer] or contact the office ([phone]), and staff will be able to page Dr. Mendosa directly.  6.  In the event you have personal crisis, contact the following crisis numbers: Suicide Prevention Hotline 1-675.338.1623; [facility] Helpline 6-651-449-ULLA; [facility] Emergency Room [phone]; text HELLO to 659098; or 665.     SPECIFIC RECOMMENDATIONS:     1.      Medications discussed at this encounter:                   - increase adderall XR     2.      Psychotherapy recommendations:

## 2023-03-20 NOTE — PROGRESS NOTES
"Subjective   Ignacio Ceja is a 32 y.o. male who presents today for initial evaluation     Referring Provider:  Ruba Pelaez, APRN  [address]  [address]  [place] [address]    Chief Complaint: Impaired concentration    History of Present Illness:     : Chart review: Seen by primary care  for concentration issues.  Seen by a pediatrics in the past, treated with Ritalin by Dr. Yanes.  Also on Adderall in the past.  Patient wanted a refill of Ritalin LA 40 mg capsules daily.  Patient stopped this treatment after high school.  Now going to school again and wants treatment for ADHD.  Had a bad break-up last year and experiencing depression, took Wellbutrin for 3 months, went up to 300 mg a day, and then stopped it.  Patient lives in [place].  PDMP is blank.  No Care Everywhere.  No labs, head imaging, EKG.    \"Jose\"    3/20: In person interview:  1. Chart review:  No new.  2. Planning: Stable, well.  3. Patient goals:  4. Lifestyle changes:  a. Trying to eat healthier for better energy.  b. Cutting back on sugars  5. Self-compassion:   6. Maladaptive thinkin. Improving interpersonal relationships:  8. Expressing difficult emotions:  9. Taking the perspective of others:  10. \"Doing alright.\"  a. ADHD: not at goal, having some difficulties focusing  11. Mood/Depression: denies  12. Anxiety: minimal anxiety, but some related to school and work  13. Panic attacks: n  14. Energy: not good, goes with ADHD  15. Concentration: not at goal  16. Sleeping: great most days  17. Eating: stable  18. Refills: y  19. Substances: def  20. Therapy: n  21. Medication compliant: y  22. No SI HI AVH.      : In person interview:  23. Chart review: No new.  24. Planning: Started booster dose at last visit.  25. \"It was good.\"  a. ADHD: Significantly better than it was.  Patient feels he is in a good place.  26. Mood/Depression: Denies  27. Anxiety: Denies  28. Panic attacks: No  29. Energy: " "Good  30. Concentration: ADHD under control  31. Sleeping: Well  32. Eating: Stable weight  33. Refills: Yes  34. Substances: Denies  35. Therapy: No  36. Medication compliant: Yes  37. No SI HI AVH.      10/20: In person interview:  38. Chart review: No new.  39. Planning: Started Adderall at last visit.  40. \"Overall it went well.\"  a. ADHD: had to experiment when to take it. Eventually settled on 9-9:30 am.  i. Effects: lasts about 8 hours, wears off before he has finished with his homework  ii. 75% it works for concentration  41. Mood/Depression: n  42. Anxiety: n  43. Energy: normal energy, much better than it was  44. Concentration:  45. Sleeping: well  46. Eating: essentially stable since 7/22  47. Refills: y  48. Substances: n  49. Therapy: n  50. Medication compliant: y  51. No SI HI AVH.      9/2: In person.  Interview:  52. Chart review: Need a urine drug screen.  53. His/Her Story: \"It started back before I can remember.\"  a. P0, G0  b. I was put on a whole trial of stuff, 6th grade-[employer]  i. Ritalin LA  ii. adderall xr 30 daily: needed to take early in the am.  iii. Dexedrine  iv. strattera  v. Unclear which one helped; pt thought adderall, charts indicate Ritalin  c. Took himself off meds due to appetite suppression  d. Notes fatigue after work and poor concentration  i. Needs a nap every day  54. Depression/Mood: denies but has a hx  a. Seasonal pattern: def  b. Severity: Moderate  c. Duration: once in the past  55. Anxiety: denies  56. Panic attacks: n  57. ADHD: dx'd as a child  a. Elementary school: can't recall grades, but no failures, [employer] student  b. Fhx: denies  c. Presently: Problems with attention for detail, sustained attention issues, cannot listen when spoken to directly, cannot finish tasks, avoids tasks that require sustained mental effort, easily distracted, forgetting things, losing things, problems organizing, talking a lot and cutting people off.  d. PTSD: denies   58. Psych ROS: " Positive for depression once (more of an adj d/o with depressed mood).  Negative for psychosis and mandie.  a. ADHD: y  b. PTSD: neg  59. No SI HI AVH.  60. Substances: denies  61. Therapy: had a therapist during his break up, no need for now  62. Medication compliant: y    Access to Firearms: denies    PHQ-9 Depression Screening  PHQ-9 Total Score:  0    Little interest or pleasure in doing things?     Feeling down, depressed, or hopeless?     Trouble falling or staying asleep, or sleeping too much?     Feeling tired or having little energy?     Poor appetite or overeating?     Feeling bad about yourself - or that you are a failure or have let yourself or your family down?     Trouble concentrating on things, such as reading the newspaper or watching television?     Moving or speaking so slowly that other people could have noticed? Or the opposite - being so fidgety or restless that you have been moving around a lot more than usual?     Thoughts that you would be better off dead, or of hurting yourself in some way?     PHQ-9 Total Score       ANA-7  Feeling nervous, anxious or on edge: Not at all  Not being able to stop or control worrying: Several days  Worrying too much about different things: Several days  Trouble Relaxing: Not at all  Being so restless that it is hard to sit still: Not at all  Feeling afraid as if something awful might happen: Not at all  Becoming easily annoyed or irritable: Not at all  ANA 7 Total Score: 2  If you checked any problems, how difficult have these problems made it for you to do your work, take care of things at home, or get along with other people: Not difficult at all    Past Surgical History:  Past Surgical History:   Procedure Laterality Date   • CIRCUMCISION         Problem List:  Patient Active Problem List   Diagnosis   • Impaired concentration       Allergy:   No Known Allergies     Discontinued Medications:  Medications Discontinued During This Encounter   Medication  Reason   • amphetamine-dextroamphetamine XR (Adderall XR) 15 MG 24 hr capsule Duplicate order   • amphetamine-dextroamphetamine (Adderall) 10 MG tablet Reorder       Current Medications:   Current Outpatient Medications   Medication Sig Dispense Refill   • [START ON 2023] amphetamine-dextroamphetamine (Adderall) 10 MG tablet Take 1 tablet by mouth Daily. 30 tablet 0   • [START ON 3/26/2023] amphetamine-dextroamphetamine XR (ADDERALL XR) 20 MG 24 hr capsule Take 1 capsule by mouth Daily 30 capsule 0     No current facility-administered medications for this visit.       Past Medical History:  History reviewed. No pertinent past medical history.    Past Psychiatric History:  Began Treatment: as a child  Diagnoses: adhd, and what sounds like adj d/o with depressed mood  Psychiatrist:Teodoro  Therapist: once, last year  Admission History: denies    Medication Trials:    Wellbutrin XL: stopped working after a while 150, 300 (increase didn't help). Stopped it because he started feeling tired all the time. Then he got better, back to his old self, so stopped.    Self Harm: Denies  Suicide Attempts:Denies   Psychosis, Anxiety, Depression: Denies    Substance Abuse History:   Types:Denies all, including illicit  Withdrawal Symptoms:Denies  Longest Period Sober:Not Applicable   AA: Not applicable     Social History:  Martial Status:Single  Employed:Yes  Kids:No  House: apt   History: Denies    Social History     Socioeconomic History   • Marital status: Single   Tobacco Use   • Smoking status: Never   • Smokeless tobacco: Never   Vaping Use   • Vaping Use: Never used   Substance and Sexual Activity   • Alcohol use: Yes     Comment: OCCASIONAL/SOCIAL   • Drug use: Never   • Sexual activity: Yes     Partners: Female     Birth control/protection: Condom       Family History:   Suicide Attempts: Denies  Suicide Completions:Denies      History reviewed. No pertinent family history.    Developmental History:  "      Childhood: Denies Abuse  High School:Completed  College: some college, got AD, now back in college, accounting    · Mental Status Exam  · Appearance  · : groomed, good eye contact, normal street clothes  · Behavior  · : pleasant and cooperative  · Motor  · : No abnormal  · Speech  · :normal rhythm, rate, volume, tone, not hyperverbal, not pressured, normal prosidy  · Mood  · : \"having some more symptoms of ADHD\"  · Affect  · : euthymic, mood congruent, good variability  · Thought Content  · : negative suicidal ideations, negative homicidal ideations, negative obsessions  · Perceptions  · : negative auditory hallucinations, negative visual hallucinations  · Thought Process  · : linear  · Insight/Judgement  · : Fair/fair  · Cognition  · : grossly intact  · Attention   : intact    Review of Systems:  Review of Systems   Constitutional: Positive for fatigue. Negative for diaphoresis.   HENT: Negative for drooling.    Eyes: Negative for visual disturbance.   Respiratory: Negative for cough and shortness of breath.    Cardiovascular: Negative for chest pain, palpitations and leg swelling.   Gastrointestinal: Negative for diarrhea, nausea and vomiting.   Endocrine: Negative for cold intolerance and heat intolerance.   Genitourinary: Negative for difficulty urinating.   Musculoskeletal: Negative for joint swelling.   Allergic/Immunologic: Negative for immunocompromised state.   Neurological: Negative for dizziness, seizures, syncope, speech difficulty, light-headedness, numbness and headaches.       Physical Exam:  Physical Exam    Vital Signs:   There were no vitals taken for this visit.     Lab Results:   No visits with results within 6 Month(s) from this visit.   Latest known visit with results is:   Lab on 09/02/2022   Component Date Value Ref Range Status   • Amphet/Methamphet, Screen 09/02/2022 Negative  Negative Final   • Barbiturates Screen, Urine 09/02/2022 Negative  Negative Final   • Benzodiazepine Screen, " Urine 09/02/2022 Negative  Negative Final   • Cocaine Screen, Urine 09/02/2022 Negative  Negative Final   • Opiate Screen 09/02/2022 Negative  Negative Final   • THC, Screen, Urine 09/02/2022 Negative  Negative Final   • Methadone Screen, Urine 09/02/2022 Negative  Negative Final   • Oxycodone Screen, Urine 09/02/2022 Negative  Negative Final       EKG Results:  No orders to display       Imaging Results:  No Images in the past 120 days found..      Assessment & Plan   Diagnoses and all orders for this visit:    1. ADHD (attention deficit hyperactivity disorder), inattentive type (Primary)  -     amphetamine-dextroamphetamine XR (ADDERALL XR) 20 MG 24 hr capsule; Take 1 capsule by mouth Daily  Dispense: 30 capsule; Refill: 0  -     amphetamine-dextroamphetamine (Adderall) 10 MG tablet; Take 1 tablet by mouth Daily.  Dispense: 30 tablet; Refill: 0        Visit Diagnoses:    ICD-10-CM ICD-9-CM   1. ADHD (attention deficit hyperactivity disorder), inattentive type  F90.0 314.00     3/20: Increase adderall XR dose for residual ADHD symptoms. Consider Azstaryz. 6 wks    12/13: Patient's ADHD is now under control.  3 months, UDS at that time    10/20: Start booster dose. CSA signed. 6 wks.     9/2: Start adderall after clean UDS. 6 wks    PLAN:  63. Safety: No acute safety concerns  64. Therapy: None  65. Risk Assessment: Risk of self-harm acutely is low to moderate.  Risk factors include history of adjustment disorder, and recent psychosocial stressors (pandemic). Protective factors include no family history, denies access to guns/weapons, no present SI, no history of suicide attempts or self-harm in the past, minimal AODA, healthcare seeking, future orientation, willingness to engage in care.  Risk of self-harm chronically is also low to moderate, but could be further elevated in the event of treatment noncompliance and/or AODA.  66. Meds:  a. INCREASE Adderall XR 15 to 20 mg daily.  CONTINUE adderall 10 mg qnoon.  Risks, benefits, side effects discussed with patient including elevated heart rate, elevated blood pressure, irritability, insomnia, sexual dysfunction, appetite suppressing properties, psychosis.  After discussion of these risks and benefits, the patient voiced understanding and agreed to proceed. UDS ordered, Eleni reviewed.  67. Labs: UDS neg    Patient screened positive for depression based on a PHQ-9 score of 0 on 10/20/2022. Follow-up recommendations include: Suicide Risk Assessment performed.           TREATMENT PLAN/GOALS: Continue supportive psychotherapy efforts and medications as indicated. Treatment and medication options discussed during today's visit. Patient acknowledged and verbally consented to continue with current treatment plan and was educated on the importance of compliance with treatment and follow-up appointments.    MEDICATION ISSUES:  ELENI reviewed as expected.  Discussed medication options and treatment plan of prescribed medication as well as the risks, benefits, and side effects including potential falls, possible impaired driving and metabolic adversities among others. Patient is agreeable to call the office with any worsening of symptoms or onset of side effects. Patient is agreeable to call 911 or go to the nearest ER should he/she begin having SI/HI. No medication side effects or related complaints today.     MEDS ORDERED DURING VISIT:  New Medications Ordered This Visit   Medications   • amphetamine-dextroamphetamine XR (ADDERALL XR) 20 MG 24 hr capsule     Sig: Take 1 capsule by mouth Daily     Dispense:  30 capsule     Refill:  0   • amphetamine-dextroamphetamine (Adderall) 10 MG tablet     Sig: Take 1 tablet by mouth Daily.     Dispense:  30 tablet     Refill:  0       Return in about 6 weeks (around 5/1/2023).         This document has been electronically signed by Juan Mendosa MD  March 20, 2023 15:18 EDT    Dictated Utilizing Dragon Dictation: Part of this note may be an  electronic transcription/translation of spoken language to printed text using the Dragon Dictation System.

## 2023-05-19 ENCOUNTER — TELEPHONE (OUTPATIENT)
Dept: PSYCHIATRY | Facility: CLINIC | Age: 33
End: 2023-05-19

## 2023-05-19 ENCOUNTER — TELEMEDICINE (OUTPATIENT)
Dept: PSYCHIATRY | Facility: CLINIC | Age: 33
End: 2023-05-19
Payer: OTHER GOVERNMENT

## 2023-05-19 DIAGNOSIS — F90.0 ADHD (ATTENTION DEFICIT HYPERACTIVITY DISORDER), INATTENTIVE TYPE: Primary | ICD-10-CM

## 2023-05-19 RX ORDER — DEXTROAMPHETAMINE SACCHARATE, AMPHETAMINE ASPARTATE MONOHYDRATE, DEXTROAMPHETAMINE SULFATE AND AMPHETAMINE SULFATE 5; 5; 5; 5 MG/1; MG/1; MG/1; MG/1
20 CAPSULE, EXTENDED RELEASE ORAL DAILY
Qty: 30 CAPSULE | Refills: 0 | Status: SHIPPED | OUTPATIENT
Start: 2023-05-19

## 2023-05-19 RX ORDER — DEXTROAMPHETAMINE SACCHARATE, AMPHETAMINE ASPARTATE, DEXTROAMPHETAMINE SULFATE AND AMPHETAMINE SULFATE 2.5; 2.5; 2.5; 2.5 MG/1; MG/1; MG/1; MG/1
10 TABLET ORAL DAILY
Qty: 30 TABLET | Refills: 0 | Status: SHIPPED | OUTPATIENT
Start: 2023-05-19

## 2023-05-19 NOTE — PATIENT INSTRUCTIONS
1.  Please return to clinic at your next scheduled visit.  Contact the clinic ([phone]) at least 24 hours prior in the event you need to cancel.  2.  Do no harm to yourself or others.    3.  Avoid alcohol and drugs.    4.  Take all medications as prescribed.  Please contact the clinic with any concerns. If you are in need of medication refills, please call the clinic at [phone].    5. Should you want to get in touch with your provider, Dr. Juan Mendosa, please utilize [employer] or contact the office ([phone]), and staff will be able to page Dr. Mendosa directly.  6.  In the event you have personal crisis, contact the following crisis numbers: Suicide Prevention Hotline 1-534.851.8036; [facility] Helpline 4-028-400-SAILAJA; [facility] Emergency Room [phone]; text HELLO to 920154; or 606.

## 2023-05-19 NOTE — PROGRESS NOTES
"Subjective   Ignacio Ceja is a 32 y.o. male who presents today for initial evaluation     Referring Provider:  No referring provider defined for this encounter.    Chief Complaint: Impaired concentration    History of Present Illness:     : Chart review: Seen by primary care  for concentration issues.  Seen by a pediatrics in the past, treated with Ritalin by Dr. Yanes.  Also on Adderall in the past.  Patient wanted a refill of Ritalin LA 40 mg capsules daily.  Patient stopped this treatment after high school.  Now going to school again and wants treatment for ADHD.  Had a bad break-up last year and experiencing depression, took Wellbutrin for 3 months, went up to 300 mg a day, and then stopped it.  Patient lives in [place].  PDMP is blank.  No Care Everywhere.  No labs, head imaging, EKG.    \"Jose\"  1. Patient goals:  2. Lifestyle changes:  a. Trying to eat healthier for better energy.  b. Cutting back on sugars  3. Self-compassion:   4. Maladaptive thinkin. Improving interpersonal relationships:  6. Expressing difficult emotions:  7. Taking the perspective of others:        : In person interview:  8. Chart review: No new.  9. Planning: Increase adderall XR dose for residual ADHD symptoms. Consider [facility]. 6 wks  10. \"No issues.\"  a. ADHD: increasing the dose did the job. Good control all day.  11. Mood/Depression: none  12. Anxiety: denies  13. Panic attacks: n  14. Energy: good  15. Concentration: at goal  16. Sleeping: well  17. Eating: some appetite suppression. Hasn't weighed himself.  18. Refills: y  19. Substances: def  20. Therapy: n  21. Medication compliant: y  22. SE: n  23. No SI HI AVH.        3/20: In person interview:  24. Chart review:  No new.  25. Planning: Stable, well.  26. \"Doing alright.\"  a. ADHD: not at goal, having some difficulties focusing  27. Mood/Depression: denies  28. Anxiety: minimal anxiety, but some related to school and work  29. Panic attacks: " "n  30. Energy: not good, goes with ADHD  31. Concentration: not at goal  32. Sleeping: great most days  33. Eating: stable  34. Refills: y  35. Substances: def  36. Therapy: n  37. Medication compliant: y  38. No SI HI AVH.      12/13: In person interview:  39. Chart review: No new.  40. Planning: Started booster dose at last visit.  41. \"It was good.\"  a. ADHD: Significantly better than it was.  Patient feels he is in a good place.  42. Mood/Depression: Denies  43. Anxiety: Denies  44. Panic attacks: No  45. Energy: Good  46. Concentration: ADHD under control  47. Sleeping: Well  48. Eating: Stable weight  49. Refills: Yes  50. Substances: Denies  51. Therapy: No  52. Medication compliant: Yes  53. No SI HI AVH.      10/20: In person interview:  54. Chart review: No new.  55. Planning: Started Adderall at last visit.  56. \"Overall it went well.\"  a. ADHD: had to experiment when to take it. Eventually settled on 9-9:30 am.  i. Effects: lasts about 8 hours, wears off before he has finished with his homework  ii. 75% it works for concentration  57. Mood/Depression: n  58. Anxiety: n  59. Energy: normal energy, much better than it was  60. Concentration:  61. Sleeping: well  62. Eating: essentially stable since 7/22  63. Refills: y  64. Substances: n  65. Therapy: n  66. Medication compliant: y  67. No SI HI AVH.      9/2: In person.  Interview:  68. Chart review: Need a urine drug screen.  69. His/Her Story: \"It started back before I can remember.\"  a. P0, G0  b. I was put on a whole trial of stuff, 6th grade-[employer]  i. Ritalin LA  ii. adderall xr 30 daily: needed to take early in the am.  iii. Dexedrine  iv. strattera  v. Unclear which one helped; pt thought adderall, charts indicate Ritalin  c. Took himself off meds due to appetite suppression  d. Notes fatigue after work and poor concentration  i. Needs a nap every day  70. Depression/Mood: denies but has a hx  a. Seasonal pattern: def  b. Severity: " Moderate  c. Duration: once in the past  71. Anxiety: denies  72. Panic attacks: n  73. ADHD: dx'd as a child  a. Elementary school: can't recall grades, but no failures, [employer] student  b. Fhx: denies  c. Presently: Problems with attention for detail, sustained attention issues, cannot listen when spoken to directly, cannot finish tasks, avoids tasks that require sustained mental effort, easily distracted, forgetting things, losing things, problems organizing, talking a lot and cutting people off.  d. PTSD: denies   74. Psych ROS: Positive for depression once (more of an adj d/o with depressed mood).  Negative for psychosis and mandie.  a. ADHD: y  b. PTSD: neg  75. No SI HI AVH.  76. Substances: denies  77. Therapy: had a therapist during his break up, no need for now  78. Medication compliant: y    Access to Firearms: denies    PHQ-9 Depression Screening  PHQ-9 Total Score:  0    Little interest or pleasure in doing things?     Feeling down, depressed, or hopeless?     Trouble falling or staying asleep, or sleeping too much?     Feeling tired or having little energy?     Poor appetite or overeating?     Feeling bad about yourself - or that you are a failure or have let yourself or your family down?     Trouble concentrating on things, such as reading the newspaper or watching television?     Moving or speaking so slowly that other people could have noticed? Or the opposite - being so fidgety or restless that you have been moving around a lot more than usual?     Thoughts that you would be better off dead, or of hurting yourself in some way?     PHQ-9 Total Score       ANA-7       Past Surgical History:  Past Surgical History:   Procedure Laterality Date   • CIRCUMCISION         Problem List:  Patient Active Problem List   Diagnosis   • Impaired concentration       Allergy:   No Known Allergies     Discontinued Medications:  Medications Discontinued During This Encounter   Medication Reason   •  amphetamine-dextroamphetamine XR (ADDERALL XR) 20 MG 24 hr capsule Reorder   • amphetamine-dextroamphetamine (Adderall) 10 MG tablet Reorder       Current Medications:   Current Outpatient Medications   Medication Sig Dispense Refill   • amphetamine-dextroamphetamine (Adderall) 10 MG tablet Take 1 tablet by mouth Daily. 30 tablet 0   • amphetamine-dextroamphetamine XR (ADDERALL XR) 20 MG 24 hr capsule Take 1 capsule by mouth Daily 30 capsule 0     No current facility-administered medications for this visit.       Past Medical History:  History reviewed. No pertinent past medical history.    Past Psychiatric History:  Began Treatment: as a child  Diagnoses: adhd, and what sounds like adj d/o with depressed mood  Psychiatrist:[facility]  Therapist: once, last year  Admission History: denies    Medication Trials:    Wellbutrin XL: stopped working after a while 150, 300 (increase didn't help). Stopped it because he started feeling tired all the time. Then he got better, back to his old self, so stopped.    Self Harm: Denies  Suicide Attempts:Denies   Psychosis, Anxiety, Depression: Denies    Substance Abuse History:   Types:Denies all, including illicit  Withdrawal Symptoms:Denies  Longest Period Sober:Not Applicable   AA: Not applicable     Social History:  Martial Status:Single  Employed:Yes  Kids:No  House: apt   History: Denies    Social History     Socioeconomic History   • Marital status: Single   Tobacco Use   • Smoking status: Never   • Smokeless tobacco: Never   Vaping Use   • Vaping Use: Never used   Substance and Sexual Activity   • Alcohol use: Yes     Comment: OCCASIONAL/SOCIAL   • Drug use: Never   • Sexual activity: Yes     Partners: Female     Birth control/protection: Condom       Family History:   Suicide Attempts: Denies  Suicide Completions:Denies      History reviewed. No pertinent family history.    Developmental History:       Childhood: Denies Abuse  High School:Completed  College:  "some college, got AD, now back in college, accounting    · Mental Status Exam  · Appearance  · : groomed, good eye contact, normal street clothes  · Behavior  · : pleasant and cooperative  · Motor  · : No abnormal  · Speech  · :normal rhythm, rate, volume, tone, not hyperverbal, not pressured, normal prosidy  · Mood  · : \"No issues\"  · Affect  · : euthymic, mood congruent, good variability  · Thought Content  · : negative suicidal ideations, negative homicidal ideations, negative obsessions  · Perceptions  · : negative auditory hallucinations, negative visual hallucinations  · Thought Process  · : linear  · Insight/Judgement  · : Fair/fair  · Cognition  · : grossly intact  · Attention   : intact    Review of Systems:  Review of Systems   Constitutional: Positive for fatigue. Negative for diaphoresis.   HENT: Negative for drooling.    Eyes: Negative for visual disturbance.   Respiratory: Negative for cough and shortness of breath.    Cardiovascular: Negative for chest pain, palpitations and leg swelling.   Gastrointestinal: Negative for diarrhea, nausea and vomiting.   Endocrine: Negative for cold intolerance and heat intolerance.   Genitourinary: Negative for difficulty urinating.   Musculoskeletal: Negative for joint swelling.   Allergic/Immunologic: Negative for immunocompromised state.   Neurological: Negative for dizziness, seizures, syncope, speech difficulty, light-headedness, numbness and headaches.       Physical Exam:  Physical Exam    Vital Signs:   There were no vitals taken for this visit.     Lab Results:   No visits with results within 6 Month(s) from this visit.   Latest known visit with results is:   Lab on 09/02/2022   Component Date Value Ref Range Status   • Amphet/Methamphet, Screen 09/02/2022 Negative  Negative Final   • Barbiturates Screen, Urine 09/02/2022 Negative  Negative Final   • Benzodiazepine Screen, Urine 09/02/2022 Negative  Negative Final   • Cocaine Screen, Urine 09/02/2022 Negative "  Negative Final   • Opiate Screen 09/02/2022 Negative  Negative Final   • THC, Screen, Urine 09/02/2022 Negative  Negative Final   • Methadone Screen, Urine 09/02/2022 Negative  Negative Final   • Oxycodone Screen, Urine 09/02/2022 Negative  Negative Final       EKG Results:  No orders to display       Imaging Results:  No Images in the past 120 days found..      Assessment & Plan   Diagnoses and all orders for this visit:    1. ADHD (attention deficit hyperactivity disorder), inattentive type (Primary)  -     amphetamine-dextroamphetamine (Adderall) 10 MG tablet; Take 1 tablet by mouth Daily.  Dispense: 30 tablet; Refill: 0  -     amphetamine-dextroamphetamine XR (ADDERALL XR) 20 MG 24 hr capsule; Take 1 capsule by mouth Daily  Dispense: 30 capsule; Refill: 0        Visit Diagnoses:    ICD-10-CM ICD-9-CM   1. ADHD (attention deficit hyperactivity disorder), inattentive type  F90.0 314.00       5/19: ADHD at goal. 3 mos      3/20: Increase adderall XR dose for residual ADHD symptoms. Consider [facility]. 6 wks    12/13: Patient's ADHD is now under control.  3 months, UDS at that time    10/20: Start booster dose. CSA signed. 6 wks.     9/2: Start adderall after clean UDS. 6 wks    PLAN:  79. Safety: No acute safety concerns  80. Therapy: None  81. Risk Assessment: Risk of self-harm acutely is low to moderate.  Risk factors include history of adjustment disorder, and recent psychosocial stressors (pandemic). Protective factors include no family history, denies access to guns/weapons, no present SI, no history of suicide attempts or self-harm in the past, minimal AODA, healthcare seeking, future orientation, willingness to engage in care.  Risk of self-harm chronically is also low to moderate, but could be further elevated in the event of treatment noncompliance and/or AODA.  82. Meds:  a. CONTINUE Adderall XR 20 mg daily.  CONTINUE adderall 10 mg qnoon. Risks, benefits, side effects discussed with patient including  elevated heart rate, elevated blood pressure, irritability, insomnia, sexual dysfunction, appetite suppressing properties, psychosis.  After discussion of these risks and benefits, the patient voiced understanding and agreed to proceed. UDS ordered, Eleni reviewed.  83. Labs: UDS neg    Patient screened positive for depression based on a PHQ-9 score of 0 on 10/20/2022. Follow-up recommendations include: Suicide Risk Assessment performed.           TREATMENT PLAN/GOALS: Continue supportive psychotherapy efforts and medications as indicated. Treatment and medication options discussed during today's visit. Patient acknowledged and verbally consented to continue with current treatment plan and was educated on the importance of compliance with treatment and follow-up appointments.    MEDICATION ISSUES:  ELENI reviewed as expected.  Discussed medication options and treatment plan of prescribed medication as well as the risks, benefits, and side effects including potential falls, possible impaired driving and metabolic adversities among others. Patient is agreeable to call the office with any worsening of symptoms or onset of side effects. Patient is agreeable to call 911 or go to the nearest ER should he/she begin having SI/HI. No medication side effects or related complaints today.     MEDS ORDERED DURING VISIT:  New Medications Ordered This Visit   Medications   • amphetamine-dextroamphetamine (Adderall) 10 MG tablet     Sig: Take 1 tablet by mouth Daily.     Dispense:  30 tablet     Refill:  0   • amphetamine-dextroamphetamine XR (ADDERALL XR) 20 MG 24 hr capsule     Sig: Take 1 capsule by mouth Daily     Dispense:  30 capsule     Refill:  0       Return in about 3 months (around 8/19/2023).         This document has been electronically signed by Juan Mendosa MD  May 19, 2023 08:44 EDT    Dictated Utilizing Dragon Dictation: Part of this note may be an electronic transcription/translation of spoken language to  printed text using the Dragon Dictation System.

## 2023-05-26 NOTE — TELEPHONE ENCOUNTER
Pt called to schedule 3 month f/u appt.     Pt scheduled  Appointment with Juan Mendosa MD (08/25/2023)

## 2023-08-25 ENCOUNTER — TELEPHONE (OUTPATIENT)
Dept: PSYCHIATRY | Facility: CLINIC | Age: 33
End: 2023-08-25

## 2023-08-25 ENCOUNTER — TELEMEDICINE (OUTPATIENT)
Dept: PSYCHIATRY | Facility: CLINIC | Age: 33
End: 2023-08-25
Payer: OTHER GOVERNMENT

## 2023-08-25 DIAGNOSIS — F90.0 ADHD (ATTENTION DEFICIT HYPERACTIVITY DISORDER), INATTENTIVE TYPE: Primary | ICD-10-CM

## 2023-08-25 RX ORDER — DEXTROAMPHETAMINE SACCHARATE, AMPHETAMINE ASPARTATE MONOHYDRATE, DEXTROAMPHETAMINE SULFATE AND AMPHETAMINE SULFATE 5; 5; 5; 5 MG/1; MG/1; MG/1; MG/1
20 CAPSULE, EXTENDED RELEASE ORAL DAILY PRN
Qty: 30 CAPSULE | Refills: 0 | Status: SHIPPED | OUTPATIENT
Start: 2023-08-25

## 2023-08-25 RX ORDER — DEXTROAMPHETAMINE SACCHARATE, AMPHETAMINE ASPARTATE, DEXTROAMPHETAMINE SULFATE AND AMPHETAMINE SULFATE 2.5; 2.5; 2.5; 2.5 MG/1; MG/1; MG/1; MG/1
10 TABLET ORAL DAILY PRN
Qty: 30 TABLET | Refills: 0 | Status: SHIPPED | OUTPATIENT
Start: 2023-08-25

## 2023-11-16 DIAGNOSIS — F90.0 ADHD (ATTENTION DEFICIT HYPERACTIVITY DISORDER), INATTENTIVE TYPE: ICD-10-CM

## 2023-11-16 RX ORDER — DEXTROAMPHETAMINE SACCHARATE, AMPHETAMINE ASPARTATE MONOHYDRATE, DEXTROAMPHETAMINE SULFATE AND AMPHETAMINE SULFATE 5; 5; 5; 5 MG/1; MG/1; MG/1; MG/1
20 CAPSULE, EXTENDED RELEASE ORAL DAILY PRN
Qty: 30 CAPSULE | Refills: 0 | Status: SHIPPED | OUTPATIENT
Start: 2023-11-16

## 2023-11-16 RX ORDER — DEXTROAMPHETAMINE SACCHARATE, AMPHETAMINE ASPARTATE, DEXTROAMPHETAMINE SULFATE AND AMPHETAMINE SULFATE 2.5; 2.5; 2.5; 2.5 MG/1; MG/1; MG/1; MG/1
10 TABLET ORAL DAILY PRN
Qty: 30 TABLET | Refills: 0 | Status: SHIPPED | OUTPATIENT
Start: 2023-11-16

## 2023-11-16 NOTE — TELEPHONE ENCOUNTER
CONTROLLED MEDICATION REFILL REQUEST    STATE REGULATION APPT EVERY 3 MONTHS     UDS(URINE DRUG SCREEN) EVERY 6 MONTHS     NEW NARC CONSENT EVERY YEAR      MEDICATION:   amphetamine-dextroamphetamine (Adderall) 10 MG tablet (08/25/2023)  amphetamine-dextroamphetamine XR (ADDERALL XR) 20 MG 24 hr capsule (08/25/2023)    PT(PATIENT) CONFIRMED PHARMACY:  [id number] [place] [address]      NEXT OFFICE VISIT: Appointment with Juan Mendosa MD (12/04/2023)     LAST OFFICE VISIT: Telemedicine with Juan Mendosa MD (08/25/2023)     NARC CONSENT: CONTROLLED SUBSTANCE AGREEMENT - SCAN - CONTROLLED SUBSTANCE CONTRACT, , 10/20/2022 (10/26/2022)     URINE DRUG SCREEN: Urine Drug Screen - Urine, Clean Catch (09/02/2022 11:12)     UDS(URINE DRUG SCREEN) PENDED FOR PROVIDER REVIEW     PROVIDER PLEASE ADVISE

## 2023-12-04 ENCOUNTER — TELEMEDICINE (OUTPATIENT)
Dept: PSYCHIATRY | Facility: CLINIC | Age: 33
End: 2023-12-04
Payer: OTHER GOVERNMENT

## 2023-12-04 DIAGNOSIS — F90.0 ADHD (ATTENTION DEFICIT HYPERACTIVITY DISORDER), INATTENTIVE TYPE: Primary | ICD-10-CM

## 2023-12-04 PROCEDURE — 99213 OFFICE O/P EST LOW 20 MIN: CPT | Performed by: STUDENT IN AN ORGANIZED HEALTH CARE EDUCATION/TRAINING PROGRAM

## 2023-12-04 NOTE — PATIENT INSTRUCTIONS
1.  Please return to clinic at your next scheduled visit.  Contact the clinic ([phone]) at least 24 hours prior in the event you need to cancel.  2.  Do no harm to yourself or others.    3.  Avoid alcohol and drugs.    4.  Take all medications as prescribed.  Please contact the clinic with any concerns. If you are in need of medication refills, please call the clinic at [phone].    5. Should you want to get in touch with your provider, Dr. Juan Mendosa, please utilize [employer] or contact the office ([phone]), and staff will be able to page Dr. Mendosa directly.  6.  In the event you have personal crisis, contact the following crisis numbers: Suicide Prevention Hotline 1-918.950.1489; SAILAJA Helpline 8-202-739-SAILAJA; [facility] Emergency Room [phone]; text HELLO to 317023; or [phone].

## 2023-12-04 NOTE — PROGRESS NOTES
"Subjective   Ignacio Ceja is a 33 y.o. male who presents today for initial evaluation     Referring Provider:  No referring provider defined for this encounter.    Chief Complaint: Impaired concentration    History of Present Illness:     : Chart review: Seen by primary care  for concentration issues.  Seen by a pediatrics in the past, treated with Ritalin by Dr. Yanes.  Also on Adderall in the past.  Patient wanted a refill of Ritalin LA 40 mg capsules daily.  Patient stopped this treatment after high school.  Now going to school again and wants treatment for ADHD.  Had a bad break-up last year and experiencing depression, took Wellbutrin for 3 months, went up to 300 mg a day, and then stopped it.  Patient lives in [place].  PDMP is blank.  No Care Everywhere.  No labs, head imaging, EKG.    \"Jose\"  Patient goals:  Lifestyle changes:  Trying to eat healthier for better energy.  Cutting back on sugars  Misc:      : In person interview:  Chart review:   No new  Plannin/25: Stable, well, no changes. Needs script to say as needed to reflect taking the meds as needed and not daily. 3 mos  \"Wonderful.\"  Likes his new job. \"It's more stimulating.\"  Moving across town to a much nicer place.  Mood/Depression: none  ADHD: stable  Anxiety: denies  Panic attacks: n  Energy: stable  Concentration: at goal  Sleeping: stable  Eatin lbs  Refills: y  Substances: def  Therapy: n  Medication compliant: y  SE: n  No SI HI AV.      : In person interview:  Chart review:   No new  No new.  Planning:   ADHD at goal  Increase adderall XR dose for residual ADHD symptoms. Consider Azstaryz. 6 wks  \"No issues.\"  ADHD: controlled  Started a new job. Taking adderall as needed, 2x/wk.  Graduated.  Mood/Depression: none  Anxiety: denies  Panic attacks: n  Energy: good  Concentration: at goal  Sleeping: well  Eating: stable weight.  Refills: y  Substances: def  Therapy: n  Medication compliant: taking " "adderall as needed  SE: n  No SI HI AVH.        5/19: In person interview:  Chart review: No new.  Planning: Increase adderall XR dose for residual ADHD symptoms. Consider Ursula. 6 wks  \"No issues.\"  ADHD: increasing the dose did the job. Good control all day.  Mood/Depression: none  Anxiety: denies  Panic attacks: n  Energy: good  Concentration: at goal  Sleeping: well  Eating: some appetite suppression. Hasn't weighed himself.  Refills: y  Substances: def  Therapy: n  Medication compliant: y  SE: n  No SI HI AVH.    ...      9/2: In person.  Interview:  Chart review: Need a urine drug screen.  His/Her Story: \"It started back before I can remember.\"  P0, G0  I was put on a whole trial of stuff, 6th grade-feliciano  Ritalin LA  adderall xr 30 daily: needed to take early in the am.  Dexedrine  strattera  Unclear which one helped; pt thought adderall, charts indicate Ritalin  Took himself off meds due to appetite suppression  Notes fatigue after work and poor concentration  Needs a nap every day  Depression/Mood: denies but has a hx  Seasonal pattern: def  Severity: Moderate  Duration: once in the past  Anxiety: denies  Panic attacks: n  ADHD: dx'd as a child  Elementary school: can't recall grades, but no failures, B and C student  Fhx: denies  Presently: Problems with attention for detail, sustained attention issues, cannot listen when spoken to directly, cannot finish tasks, avoids tasks that require sustained mental effort, easily distracted, forgetting things, losing things, problems organizing, talking a lot and cutting people off.  PTSD: denies   Psych ROS: Positive for depression once (more of an adj d/o with depressed mood).  Negative for psychosis and mandie.  ADHD: y  PTSD: neg  No SI HI AVH.  Substances: denies  Therapy: had a therapist during his break up, no need for now  Medication compliant: y    Access to Firearms: denies    PHQ-9 Depression Screening  PHQ-9 Total Score:  0    Little interest or pleasure " in doing things?     Feeling down, depressed, or hopeless?     Trouble falling or staying asleep, or sleeping too much?     Feeling tired or having little energy?     Poor appetite or overeating?     Feeling bad about yourself - or that you are a failure or have let yourself or your family down?     Trouble concentrating on things, such as reading the newspaper or watching television?     Moving or speaking so slowly that other people could have noticed? Or the opposite - being so fidgety or restless that you have been moving around a lot more than usual?     Thoughts that you would be better off dead, or of hurting yourself in some way?     PHQ-9 Total Score       ANA-7       Past Surgical History:  Past Surgical History:   Procedure Laterality Date    CIRCUMCISION         Problem List:  Patient Active Problem List   Diagnosis    Impaired concentration       Allergy:   No Known Allergies     Discontinued Medications:  There are no discontinued medications.      Current Medications:   Current Outpatient Medications   Medication Sig Dispense Refill    amphetamine-dextroamphetamine (Adderall) 10 MG tablet Take 1 tablet by mouth Daily As Needed (poor concentration). 30 tablet 0    amphetamine-dextroamphetamine XR (ADDERALL XR) 20 MG 24 hr capsule Take 1 capsule by mouth Daily As Needed (poor concentration) 30 capsule 0     No current facility-administered medications for this visit.       Past Medical History:  History reviewed. No pertinent past medical history.    Past Psychiatric History:  Began Treatment: as a child  Diagnoses: adhd, and what sounds like adj d/o with depressed mood  Psychiatrist:Denies  Therapist: once, last year  Admission History: denies    Medication Trials:    Wellbutrin XL: stopped working after a while 150, 300 (increase didn't help). Stopped it because he started feeling tired all the time. Then he got better, back to his old self, so stopped.    Self Harm: Denies  Suicide  "Attempts:Denies   Psychosis, Anxiety, Depression: Denies    Substance Abuse History:   Types:Denies all, including illicit  Withdrawal Symptoms:Denies  Longest Period Sober:Not Applicable   AA: Not applicable     Social History:  Martial Status:Single  Employed:Yes  Kids:No  House: apt   History: Denies    Social History     Socioeconomic History    Marital status: Single   Tobacco Use    Smoking status: Never    Smokeless tobacco: Never   Vaping Use    Vaping Use: Never used   Substance and Sexual Activity    Alcohol use: Yes     Comment: OCCASIONAL/SOCIAL    Drug use: Never    Sexual activity: Yes     Partners: Female     Birth control/protection: Condom       Family History:   Suicide Attempts: Denies  Suicide Completions:Denies      History reviewed. No pertinent family history.    Developmental History:       Childhood: Denies Abuse  High School:Completed  College: some college, got AD, now back in college, accounting    Mental Status Exam  Appearance  : groomed, good eye contact, normal street clothes  Behavior  : pleasant and cooperative  Motor  : No abnormal  Speech  :normal rhythm, rate, volume, tone, not hyperverbal, not pressured, normal prosidy  Mood  : \"wonderful\" (again)  Affect  : euthymic, mood congruent, good variability  Thought Content  : negative suicidal ideations, negative homicidal ideations, negative obsessions  Perceptions  : negative auditory hallucinations, negative visual hallucinations  Thought Process  : linear  Insight/Judgement  : Fair/fair  Cognition  : grossly intact  Attention   : intact    Review of Systems:  Review of Systems   Constitutional:  Positive for fatigue. Negative for diaphoresis.   HENT:  Negative for drooling.    Eyes:  Negative for visual disturbance.   Respiratory:  Negative for cough and shortness of breath.    Cardiovascular:  Negative for chest pain, palpitations and leg swelling.   Gastrointestinal:  Negative for diarrhea, nausea and vomiting. "   Endocrine: Negative for cold intolerance and heat intolerance.   Genitourinary:  Negative for difficulty urinating.   Musculoskeletal:  Negative for joint swelling.   Allergic/Immunologic: Negative for immunocompromised state.   Neurological:  Negative for dizziness, seizures, syncope, speech difficulty, light-headedness, numbness and headaches.       Physical Exam:  Physical Exam    Vital Signs:   There were no vitals taken for this visit.     Lab Results:   No visits with results within 6 Month(s) from this visit.   Latest known visit with results is:   Lab on 09/02/2022   Component Date Value Ref Range Status    Amphet/Methamphet, Screen 09/02/2022 Negative  Negative Final    Barbiturates Screen, Urine 09/02/2022 Negative  Negative Final    Benzodiazepine Screen, Urine 09/02/2022 Negative  Negative Final    Cocaine Screen, Urine 09/02/2022 Negative  Negative Final    Opiate Screen 09/02/2022 Negative  Negative Final    THC, Screen, Urine 09/02/2022 Negative  Negative Final    Methadone Screen, Urine 09/02/2022 Negative  Negative Final    Oxycodone Screen, Urine 09/02/2022 Negative  Negative Final       EKG Results:  No orders to display       Imaging Results:  No Images in the past 120 days found..      Assessment & Plan   Diagnoses and all orders for this visit:    1. ADHD (attention deficit hyperactivity disorder), inattentive type (Primary)        Visit Diagnoses:    ICD-10-CM ICD-9-CM   1. ADHD (attention deficit hyperactivity disorder), inattentive type  F90.0 314.00     12/4: stable, well, no changes. 3 mos    8/25: Stable, well, no changes. Needs script to say as needed to reflect taking the meds as needed and not daily. 3 mos    5/19: ADHD at goal. 3 mos    3/20: Increase adderall XR dose for residual ADHD symptoms. Consider Azstaryz. 6 wks    12/13: Patient's ADHD is now under control.  3 months, UDS at that time    10/20: Start booster dose. CSA signed. 6 wks.     9/2: Start adderall after clean UDS.  6 wks    PLAN:  Safety: No acute safety concerns  Therapy: None  Risk Assessment: Risk of self-harm acutely is low to moderate.  Risk factors include history of adjustment disorder, and recent psychosocial stressors (pandemic). Protective factors include no family history, denies access to guns/weapons, no present SI, no history of suicide attempts or self-harm in the past, minimal AODA, healthcare seeking, future orientation, willingness to engage in care.  Risk of self-harm chronically is also low to moderate, but could be further elevated in the event of treatment noncompliance and/or AODA.  Meds:  CONTINUE Adderall XR 20 mg daily.  CONTINUE adderall 10 mg qnoon. Risks, benefits, side effects discussed with patient including elevated heart rate, elevated blood pressure, irritability, insomnia, sexual dysfunction, appetite suppressing properties, psychosis.  After discussion of these risks and benefits, the patient voiced understanding and agreed to proceed. UDS ordered, Eleni reviewed.  Labs: UDS neg    Patient screened positive for depression based on a PHQ-9 score of 0 on 10/20/2022. Follow-up recommendations include: Suicide Risk Assessment performed.           TREATMENT PLAN/GOALS: Continue supportive psychotherapy efforts and medications as indicated. Treatment and medication options discussed during today's visit. Patient acknowledged and verbally consented to continue with current treatment plan and was educated on the importance of compliance with treatment and follow-up appointments.    MEDICATION ISSUES:  ELENI reviewed as expected.  Discussed medication options and treatment plan of prescribed medication as well as the risks, benefits, and side effects including potential falls, possible impaired driving and metabolic adversities among others. Patient is agreeable to call the office with any worsening of symptoms or onset of side effects. Patient is agreeable to call 911 or go to the nearest ER  should he/she begin having SI/HI. No medication side effects or related complaints today.     MEDS ORDERED DURING VISIT:  No orders of the defined types were placed in this encounter.      Return in about 3 months (around 3/4/2024).         This document has been electronically signed by Juan Mendosa MD  December 4, 2023 12:23 EST    Dictated Utilizing Dragon Dictation: Part of this note may be an electronic transcription/translation of spoken language to printed text using the Dragon Dictation System.

## 2024-03-04 NOTE — PROGRESS NOTES
"Subjective   Ignacio Ceja is a 33 y.o. male who presents today for initial evaluation     Referring Provider:  No referring provider defined for this encounter.    Chief Complaint: Impaired concentration    History of Present Illness:     : Chart review: Seen by primary care  for concentration issues.  Seen by a pediatrics in the past, treated with Ritalin by Dr. Yanes.  Also on Adderall in the past.  Patient wanted a refill of Ritalin LA 40 mg capsules daily.  Patient stopped this treatment after high school.  Now going to school again and wants treatment for ADHD.  Had a bad break-up last year and experiencing depression, took Wellbutrin for 3 months, went up to 300 mg a day, and then stopped it.  Patient lives in [place].  PDMP is blank.  No Care Everywhere.  No labs, head imaging, EKG.    \"Jose\"  Patient goals:  Lifestyle changes:  Trying to eat healthier for better energy.  Cutting back on sugars  Misc:      3/5: In person interview:  Chart review:   3/4: no visits.  No new  Plannin/4: stable, well, no changes. 3 mos  : Stable, well, no changes. Needs script to say as needed to reflect taking the meds as needed and not daily. 3 mos  \"Good.\"  No complaints or concerns.  Seasonal mood  Mood/Depression: seasonal   ADHD: stable  Anxiety: denies  Panic attacks: n  Energy: stable  Concentration: at goal  Sleeping: stable  Eatin, 143 lbs  Refills: y  Substances: def  Therapy: n  Medication compliant: y  SE: n  No SI HI AVH.      : In person interview:  Chart review:   No new  Plannin/25: Stable, well, no changes. Needs script to say as needed to reflect taking the meds as needed and not daily. 3 mos  \"Wonderful.\"  Likes his new job. \"It's more stimulating.\"  Moving across town to a much nicer place.  Mood/Depression: none  ADHD: stable  Anxiety: denies  Panic attacks: n  Energy: stable  Concentration: at goal  Sleeping: stable  Eatin lbs  Refills: y  Substances: " "def  Therapy: n  Medication compliant: y  SE: n  No SI HI AVH.      8/25: In person interview:  Chart review:   No new  No new.  Planning:   ADHD at goal  Increase adderall XR dose for residual ADHD symptoms. Consider Azstaryz. 6 wks  \"No issues.\"  ADHD: controlled  Started a new job. Taking adderall as needed, 2x/wk.  Graduated.  Mood/Depression: none  Anxiety: denies  Panic attacks: n  Energy: good  Concentration: at goal  Sleeping: well  Eating: stable weight.  Refills: y  Substances: def  Therapy: n  Medication compliant: taking adderall as needed  SE: n  No SI HI AVH.        5/19: In person interview:  Chart review: No new.  Planning: Increase adderall XR dose for residual ADHD symptoms. Consider Azstaryz. 6 wks  \"No issues.\"  ADHD: increasing the dose did the job. Good control all day.  Mood/Depression: none  Anxiety: denies  Panic attacks: n  Energy: good  Concentration: at goal  Sleeping: well  Eating: some appetite suppression. Hasn't weighed himself.  Refills: y  Substances: def  Therapy: n  Medication compliant: y  SE: n  No SI HI AVH.    ...      9/2: In person.  Interview:  Chart review: Need a urine drug screen.  His/Her Story: \"It started back before I can remember.\"  P0, G0  I was put on a whole trial of stuff, 6th grade-feliciano  Ritalin LA  adderall xr 30 daily: needed to take early in the am.  Dexedrine  strattera  Unclear which one helped; pt thought adderall, charts indicate Ritalin  Took himself off meds due to appetite suppression  Notes fatigue after work and poor concentration  Needs a nap every day  Depression/Mood: denies but has a hx  Seasonal pattern: def  Severity: Moderate  Duration: once in the past  Anxiety: denies  Panic attacks: n  ADHD: dx'd as a child  Elementary school: can't recall grades, but no failures, B and C student  Fhx: denies  Presently: Problems with attention for detail, sustained attention issues, cannot listen when spoken to directly, cannot finish tasks, avoids tasks " that require sustained mental effort, easily distracted, forgetting things, losing things, problems organizing, talking a lot and cutting people off.  PTSD: denies   Psych ROS: Positive for depression once (more of an adj d/o with depressed mood).  Negative for psychosis and mandie.  ADHD: y  PTSD: neg  No SI HI AVH.  Substances: denies  Therapy: had a therapist during his break up, no need for now  Medication compliant: y    Access to Firearms: denies    PHQ-9 Depression Screening  PHQ-9 Total Score:  0    Little interest or pleasure in doing things?     Feeling down, depressed, or hopeless?     Trouble falling or staying asleep, or sleeping too much?     Feeling tired or having little energy?     Poor appetite or overeating?     Feeling bad about yourself - or that you are a failure or have let yourself or your family down?     Trouble concentrating on things, such as reading the newspaper or watching television?     Moving or speaking so slowly that other people could have noticed? Or the opposite - being so fidgety or restless that you have been moving around a lot more than usual?     Thoughts that you would be better off dead, or of hurting yourself in some way?     PHQ-9 Total Score       ANA-7       Past Surgical History:  Past Surgical History:   Procedure Laterality Date    CIRCUMCISION         Problem List:  Patient Active Problem List   Diagnosis    Impaired concentration       Allergy:   No Known Allergies     Discontinued Medications:  There are no discontinued medications.      Current Medications:   Current Outpatient Medications   Medication Sig Dispense Refill    amphetamine-dextroamphetamine (Adderall) 10 MG tablet Take 1 tablet by mouth Daily As Needed (poor concentration). 30 tablet 0    amphetamine-dextroamphetamine XR (ADDERALL XR) 20 MG 24 hr capsule Take 1 capsule by mouth Daily As Needed (poor concentration) 30 capsule 0     No current facility-administered medications for this visit.  "      Past Medical History:  History reviewed. No pertinent past medical history.    Past Psychiatric History:  Began Treatment: as a child  Diagnoses: adhd, and what sounds like adj d/o with depressed mood  Psychiatrist:Teodoro  Therapist: once, last year  Admission History: denies    Medication Trials:    Wellbutrin XL: stopped working after a while 150, 300 (increase didn't help). Stopped it because he started feeling tired all the time. Then he got better, back to his old self, so stopped.    Self Harm: Denies  Suicide Attempts:Denies   Psychosis, Anxiety, Depression: Denies    Substance Abuse History:   Types:Denies all, including illicit  Withdrawal Symptoms:Denies  Longest Period Sober:Not Applicable   AA: Not applicable     Social History:  Martial Status:Single  Employed:Yes  Kids:No  House: apt   History: Denies    Social History     Socioeconomic History    Marital status: Single   Tobacco Use    Smoking status: Never    Smokeless tobacco: Never   Vaping Use    Vaping status: Never Used   Substance and Sexual Activity    Alcohol use: Yes     Comment: OCCASIONAL/SOCIAL    Drug use: Never    Sexual activity: Yes     Partners: Female     Birth control/protection: Condom       Family History:   Suicide Attempts: Denies  Suicide Completions:Denies      History reviewed. No pertinent family history.    Developmental History:       Childhood: Denies Abuse  High School:Completed  College: some college, got AD, now back in college, accounting    Mental Status Exam  Appearance  : groomed, good eye contact, normal street clothes  Behavior  : pleasant and cooperative  Motor  : No abnormal  Speech  :normal rhythm, rate, volume, tone, not hyperverbal, not pressured, normal prosidy  Mood  : \"I'm good\"  Affect  : euthymic, mood congruent, good variability  Thought Content  : negative suicidal ideations, negative homicidal ideations, negative obsessions  Perceptions  : negative auditory hallucinations, " negative visual hallucinations  Thought Process  : linear  Insight/Judgement  : Fair/fair  Cognition  : grossly intact  Attention   : intact    Review of Systems:  Review of Systems   Constitutional:  Positive for fatigue. Negative for diaphoresis.   HENT:  Negative for drooling.    Eyes:  Negative for visual disturbance.   Respiratory:  Negative for cough and shortness of breath.    Cardiovascular:  Negative for chest pain, palpitations and leg swelling.   Gastrointestinal:  Negative for diarrhea, nausea and vomiting.   Endocrine: Negative for cold intolerance and heat intolerance.   Genitourinary:  Negative for difficulty urinating.   Musculoskeletal:  Negative for joint swelling.   Allergic/Immunologic: Negative for immunocompromised state.   Neurological:  Negative for dizziness, seizures, syncope, speech difficulty, light-headedness, numbness and headaches.       Physical Exam:  Physical Exam    Vital Signs:   There were no vitals taken for this visit.     Lab Results:   No visits with results within 6 Month(s) from this visit.   Latest known visit with results is:   Lab on 09/02/2022   Component Date Value Ref Range Status    Amphet/Methamphet, Screen 09/02/2022 Negative  Negative Final    Barbiturates Screen, Urine 09/02/2022 Negative  Negative Final    Benzodiazepine Screen, Urine 09/02/2022 Negative  Negative Final    Cocaine Screen, Urine 09/02/2022 Negative  Negative Final    Opiate Screen 09/02/2022 Negative  Negative Final    THC, Screen, Urine 09/02/2022 Negative  Negative Final    Methadone Screen, Urine 09/02/2022 Negative  Negative Final    Oxycodone Screen, Urine 09/02/2022 Negative  Negative Final       EKG Results:  No orders to display       Imaging Results:  No Images in the past 120 days found..      Assessment & Plan   Diagnoses and all orders for this visit:    1. ADHD (attention deficit hyperactivity disorder), inattentive type (Primary)        Visit Diagnoses:    ICD-10-CM ICD-9-CM   1.  ADHD (attention deficit hyperactivity disorder), inattentive type  F90.0 314.00     3/5: Seasonal depression, discussed light box, pt to consider. Otherwise stable. 3m    12/4: stable, well, no changes. 3 mos    8/25: Stable, well, no changes. Needs script to say as needed to reflect taking the meds as needed and not daily. 3 mos    5/19: ADHD at goal. 3 mos    3/20: Increase adderall XR dose for residual ADHD symptoms. Consider Azstaryz. 6 wks    12/13: Patient's ADHD is now under control.  3 months, UDS at that time    10/20: Start booster dose. CSA signed. 6 wks.     9/2: Start adderall after clean UDS. 6 wks    PLAN:  Safety: No acute safety concerns  Therapy: None  Risk Assessment: Risk of self-harm acutely is low to moderate.  Risk factors include history of adjustment disorder, and recent psychosocial stressors (pandemic). Protective factors include no family history, denies access to guns/weapons, no present SI, no history of suicide attempts or self-harm in the past, minimal AODA, healthcare seeking, future orientation, willingness to engage in care.  Risk of self-harm chronically is also low to moderate, but could be further elevated in the event of treatment noncompliance and/or AODA.  Meds:  CONTINUE Adderall XR 20 mg daily.  CONTINUE adderall 10 mg qnoon. Risks, benefits, side effects discussed with patient including elevated heart rate, elevated blood pressure, irritability, insomnia, sexual dysfunction, appetite suppressing properties, psychosis.  After discussion of these risks and benefits, the patient voiced understanding and agreed to proceed. UDS ordered, Bill reviewed.  Labs: UDS neg    Patient screened positive for depression based on a PHQ-9 score of 0 on 10/20/2022. Follow-up recommendations include: Suicide Risk Assessment performed.           TREATMENT PLAN/GOALS: Continue supportive psychotherapy efforts and medications as indicated. Treatment and medication options discussed during  today's visit. Patient acknowledged and verbally consented to continue with current treatment plan and was educated on the importance of compliance with treatment and follow-up appointments.    MEDICATION ISSUES:  ELENI reviewed as expected.  Discussed medication options and treatment plan of prescribed medication as well as the risks, benefits, and side effects including potential falls, possible impaired driving and metabolic adversities among others. Patient is agreeable to call the office with any worsening of symptoms or onset of side effects. Patient is agreeable to call 911 or go to the nearest ER should he/she begin having SI/HI. No medication side effects or related complaints today.     MEDS ORDERED DURING VISIT:  No orders of the defined types were placed in this encounter.      Return in about 3 months (around 6/5/2024).         This document has been electronically signed by Juan Mendosa MD  March 5, 2024 07:45 EST    Dictated Utilizing Dragon Dictation: Part of this note may be an electronic transcription/translation of spoken language to printed text using the Dragon Dictation System.

## 2024-03-04 NOTE — PATIENT INSTRUCTIONS
1.  Please return to clinic at your next scheduled visit.  Contact the clinic ([phone]) at least 24 hours prior in the event you need to cancel.  2.  Do no harm to yourself or others.    3.  Avoid alcohol and drugs.    4.  Take all medications as prescribed.  Please contact the clinic with any concerns. If you are in need of medication refills, please call the clinic at [phone].    5. Should you want to get in touch with your provider, Dr. Juan Mendosa, please utilize [employer] or contact the office ([phone]), and staff will be able to page Dr. Mendosa directly.  6.  In the event you have personal crisis, contact the following crisis numbers: Suicide Prevention Hotline 1-160.787.5037; [facility] Helpline 3-732-773-HKPW; [facility] Emergency Room [phone]; text HELLO to 462072; or 478.    Light box/Happy Light/Light Therapy: Obtain a light box, 10,000 lux, put the box about 1 foot away from you, facing you at an angle (not directly), use it daily, right after waking up, for 1 hour daily. Don't stare directly at it. Read, eat, watch tv, etc. Call your insurance company to see if they can reimburse you for the cost. Available at big box retailers. I used [employer] to get a [place] one.

## 2024-03-05 ENCOUNTER — TELEPHONE (OUTPATIENT)
Dept: PSYCHIATRY | Facility: CLINIC | Age: 34
End: 2024-03-05

## 2024-03-05 ENCOUNTER — TELEMEDICINE (OUTPATIENT)
Dept: PSYCHIATRY | Facility: CLINIC | Age: 34
End: 2024-03-05
Payer: OTHER GOVERNMENT

## 2024-03-05 DIAGNOSIS — F90.0 ADHD (ATTENTION DEFICIT HYPERACTIVITY DISORDER), INATTENTIVE TYPE: Primary | ICD-10-CM

## 2024-10-14 ENCOUNTER — OFFICE VISIT (OUTPATIENT)
Dept: FAMILY MEDICINE CLINIC | Age: 34
End: 2024-10-14
Payer: COMMERCIAL

## 2024-10-14 VITALS
SYSTOLIC BLOOD PRESSURE: 122 MMHG | HEART RATE: 76 BPM | DIASTOLIC BLOOD PRESSURE: 68 MMHG | BODY MASS INDEX: 23.04 KG/M2 | HEIGHT: 67 IN | WEIGHT: 146.8 LBS | TEMPERATURE: 98.7 F

## 2024-10-14 DIAGNOSIS — Z00.00 ROUTINE GENERAL MEDICAL EXAMINATION AT A HEALTH CARE FACILITY: Primary | ICD-10-CM

## 2024-10-14 DIAGNOSIS — Z11.59 SCREENING FOR VIRAL DISEASE: ICD-10-CM

## 2024-10-14 DIAGNOSIS — Z82.49 FAMILY HISTORY OF ISCHEMIC HEART DISEASE: ICD-10-CM

## 2024-10-14 PROCEDURE — 99395 PREV VISIT EST AGE 18-39: CPT | Performed by: NURSE PRACTITIONER

## 2024-10-14 NOTE — PROGRESS NOTES
Chief Complaint  Annual Exam    Subjective          Ignacio Ceja presents to [facility]    History of Present Illness  General Health Questions  Regular exercise as least 3 days a week: no  Follows a healthy diet: tries   Wears seat belt always: yes   Drinks Alcohol: rarely   Uses Recreational drugs: none   Uses tobacco products: none   UTD on Tetanus vaccine: thinks he had one in    Does HOMER: yes, no masses        PAST MEDICAL HISTORY changes since  when last seen at our office :      ADHD was seeing Dr Navya Barrera +       Surgical History/hospitalizations:     Circumcision          Family History:        Father: CAD    Mother: Healthy     Sister(s): Healthy; 1 sister(s) total     Paternal Grandfather: , MI, 80     Paternal Grandmother:      Maternal Grandfather:      Maternal Grandmother:          Social History:       Occupation: [employer]/[place]  /    Marital Status: Single / in a relationship X 2 years     Children: None   [employer] for 6 years, out in          History reviewed. No pertinent past medical history.    No Known Allergies     Past Surgical History:   Procedure Laterality Date    CIRCUMCISION          Social History     Tobacco Use    Smoking status: Never    Smokeless tobacco: Never   Substance Use Topics    Alcohol use: Yes     Comment: OCCASIONAL/SOCIAL       History reviewed. No pertinent family history.     Health Maintenance Due   Topic Date Due    TDAP/TD VACCINES (2 - Tdap) 10/05/2014    HEPATITIS C SCREENING  Never done        Current Outpatient Medications on File Prior to Visit   Medication Sig    [DISCONTINUED] amphetamine-dextroamphetamine (Adderall) 10 MG tablet Take 1 tablet by mouth Daily As Needed (poor concentration). (Patient not taking: Reported on 10/14/2024)    [DISCONTINUED] amphetamine-dextroamphetamine XR (ADDERALL XR) 20 MG 24 hr capsule Take 1 capsule by  "mouth Daily As Needed (poor concentration) (Patient not taking: Reported on 10/14/2024)     No current facility-administered medications on file prior to visit.       Immunization History   Administered Date(s) Administered    COVID-19 (GREGORIA) 03/01/2021    COVID-19 (PFIZER) Purple Cap Monovalent 12/29/2021    Td (TDVAX) 10/05/2004       Review of Systems   Constitutional:  Negative for fatigue and fever.   HENT:  Negative for ear pain and sore throat.    Eyes:  Negative for blurred vision.   Respiratory:  Negative for cough and shortness of breath.    Cardiovascular:  Negative for chest pain, palpitations and leg swelling.   Gastrointestinal:  Negative for abdominal pain, constipation, diarrhea, nausea and vomiting.   Musculoskeletal:  Negative for arthralgias and myalgias.   Skin:  Negative for rash.   Neurological:  Negative for dizziness, weakness and headache.   Psychiatric/Behavioral:  Negative for sleep disturbance and depressed mood. The patient is nervous/anxious (his dad has had CAD and recently had another heart cath and has additional blockage, so he is worried now about his heart).         Objective     Vitals:    10/14/24 1246   BP: 122/68   BP Location: Right arm   Patient Position: Sitting   Cuff Size: Adult   Pulse: 76   Temp: 98.7 °F (37.1 °C)   TempSrc: Oral   Weight: 66.6 kg (146 lb 12.8 oz)   Height: 170.2 cm (67\")            Physical Exam  Vitals reviewed.   Constitutional:       Appearance: Normal appearance. He is well-developed.   HENT:      Head: Normocephalic and atraumatic.      Right Ear: External ear normal.      Left Ear: External ear normal.      Mouth/Throat:      Pharynx: No oropharyngeal exudate.   Eyes:      Conjunctiva/sclera: Conjunctivae normal.      Pupils: Pupils are equal, round, and reactive to light.   Cardiovascular:      Rate and Rhythm: Normal rate and regular rhythm.      Heart sounds: No murmur heard.     No friction rub. No gallop.   Pulmonary:      Effort: " Pulmonary effort is normal.      Breath sounds: Normal breath sounds. No wheezing or rhonchi.   Abdominal:      General: Bowel sounds are normal. There is no distension.      Palpations: Abdomen is soft.      Tenderness: There is no abdominal tenderness.      Comments:       Skin:     General: Skin is warm and dry.   Neurological:      Mental Status: He is alert and oriented to person, place, and time.      Cranial Nerves: No cranial nerve deficit.   Psychiatric:         Mood and Affect: Mood and affect normal.         Behavior: Behavior normal.         Thought Content: Thought content normal.         Judgment: Judgment normal.         Result Review :     The following data was reviewed by: JUNO Castellanos on 10/14/2024:                       Assessment and Plan      Diagnoses and all orders for this visit:    1. Routine general medical examination at a health care facility (Primary)  Assessment & Plan:  Advise regular exercise, healthy eating, always wear seat belts.   Immunizations discussed, declines flu and covid vaccines today.   advised yearly optometry and dental exams.    He will return fasting for labs and pending results may follow up to establish with a PCP     Orders:  -     Comprehensive Metabolic Panel; Future  -     CBC & Differential; Future  -     TSH; Future  -     Lipid Panel; Future    2. Family history of ischemic heart disease  Assessment & Plan:  Discussed risk factors for CAD, work up, will get fasting labs.       3. Screening for viral disease  -     Hepatitis C Antibody; Future        BMI is within normal parameters. No other follow-up for BMI required.           Follow Up     Return for fasting for labs.    Patient was given instructions and counseling regarding his condition or for health maintenance advice. Please see specific information pulled into the AVS if appropriate.

## 2024-10-14 NOTE — ASSESSMENT & PLAN NOTE
Advise regular exercise, healthy eating, always wear seat belts.   Immunizations discussed, declines flu and covid vaccines today.   advised yearly optometry and dental exams.    He will return fasting for labs and pending results may follow up to establish with a PCP

## 2024-10-15 ENCOUNTER — LAB (OUTPATIENT)
Dept: LAB | Facility: HOSPITAL | Age: 34
End: 2024-10-15
Payer: COMMERCIAL

## 2024-10-15 DIAGNOSIS — Z00.00 ROUTINE GENERAL MEDICAL EXAMINATION AT A HEALTH CARE FACILITY: ICD-10-CM

## 2024-10-15 DIAGNOSIS — Z11.59 SCREENING FOR VIRAL DISEASE: ICD-10-CM

## 2024-10-15 LAB
ALBUMIN SERPL-MCNC: 4.5 G/DL (ref 3.5–5.2)
ALBUMIN/GLOB SERPL: 1.6 G/DL
ALP SERPL-CCNC: 69 U/L (ref 39–117)
ALT SERPL W P-5'-P-CCNC: 15 U/L (ref 1–41)
ANION GAP SERPL CALCULATED.3IONS-SCNC: 8.3 MMOL/L (ref 5–15)
AST SERPL-CCNC: 22 U/L (ref 1–40)
BASOPHILS # BLD AUTO: 0.03 10*3/MM3 (ref 0–0.2)
BASOPHILS NFR BLD AUTO: 0.6 % (ref 0–1.5)
BILIRUB SERPL-MCNC: 1 MG/DL (ref 0–1.2)
BUN SERPL-MCNC: 15 MG/DL (ref 6–20)
BUN/CREAT SERPL: 10.8 (ref 7–25)
CALCIUM SPEC-SCNC: 9.7 MG/DL (ref 8.6–10.5)
CHLORIDE SERPL-SCNC: 103 MMOL/L (ref 98–107)
CHOLEST SERPL-MCNC: 165 MG/DL (ref 0–200)
CO2 SERPL-SCNC: 27.7 MMOL/L (ref 22–29)
CREAT SERPL-MCNC: 1.39 MG/DL (ref 0.76–1.27)
DEPRECATED RDW RBC AUTO: 38.6 FL (ref 37–54)
EGFRCR SERPLBLD CKD-EPI 2021: 68.2 ML/MIN/1.73
EOSINOPHIL # BLD AUTO: 0.05 10*3/MM3 (ref 0–0.4)
EOSINOPHIL NFR BLD AUTO: 1.1 % (ref 0.3–6.2)
ERYTHROCYTE [DISTWIDTH] IN BLOOD BY AUTOMATED COUNT: 12 % (ref 12.3–15.4)
GLOBULIN UR ELPH-MCNC: 2.9 GM/DL
GLUCOSE SERPL-MCNC: 97 MG/DL (ref 65–99)
HCT VFR BLD AUTO: 45.6 % (ref 37.5–51)
HCV AB SER QL: NORMAL
HDLC SERPL-MCNC: 38 MG/DL (ref 40–60)
HGB BLD-MCNC: 15.9 G/DL (ref 13–17.7)
IMM GRANULOCYTES # BLD AUTO: 0 10*3/MM3 (ref 0–0.05)
IMM GRANULOCYTES NFR BLD AUTO: 0 % (ref 0–0.5)
LDLC SERPL CALC-MCNC: 112 MG/DL (ref 0–100)
LDLC/HDLC SERPL: 2.92 {RATIO}
LYMPHOCYTES # BLD AUTO: 1.53 10*3/MM3 (ref 0.7–3.1)
LYMPHOCYTES NFR BLD AUTO: 33.1 % (ref 19.6–45.3)
MCH RBC QN AUTO: 30.3 PG (ref 26.6–33)
MCHC RBC AUTO-ENTMCNC: 34.9 G/DL (ref 31.5–35.7)
MCV RBC AUTO: 87 FL (ref 79–97)
MONOCYTES # BLD AUTO: 0.37 10*3/MM3 (ref 0.1–0.9)
MONOCYTES NFR BLD AUTO: 8 % (ref 5–12)
NEUTROPHILS NFR BLD AUTO: 2.64 10*3/MM3 (ref 1.7–7)
NEUTROPHILS NFR BLD AUTO: 57.2 % (ref 42.7–76)
PLATELET # BLD AUTO: 203 10*3/MM3 (ref 140–450)
PMV BLD AUTO: 9.9 FL (ref 6–12)
POTASSIUM SERPL-SCNC: 4.3 MMOL/L (ref 3.5–5.2)
PROT SERPL-MCNC: 7.4 G/DL (ref 6–8.5)
RBC # BLD AUTO: 5.24 10*6/MM3 (ref 4.14–5.8)
SODIUM SERPL-SCNC: 139 MMOL/L (ref 136–145)
TRIGL SERPL-MCNC: 81 MG/DL (ref 0–150)
TSH SERPL DL<=0.05 MIU/L-ACNC: 1.77 UIU/ML (ref 0.27–4.2)
VLDLC SERPL-MCNC: 15 MG/DL (ref 5–40)
WBC NRBC COR # BLD AUTO: 4.62 10*3/MM3 (ref 3.4–10.8)

## 2024-10-15 PROCEDURE — 80050 GENERAL HEALTH PANEL: CPT

## 2024-10-15 PROCEDURE — 36415 COLL VENOUS BLD VENIPUNCTURE: CPT

## 2024-10-15 PROCEDURE — 80061 LIPID PANEL: CPT

## 2024-10-15 PROCEDURE — 86803 HEPATITIS C AB TEST: CPT

## 2025-08-14 ENCOUNTER — OFFICE VISIT (OUTPATIENT)
Dept: FAMILY MEDICINE CLINIC | Age: 35
End: 2025-08-14
Payer: COMMERCIAL

## 2025-08-14 ENCOUNTER — LAB (OUTPATIENT)
Dept: LAB | Facility: HOSPITAL | Age: 35
End: 2025-08-14
Payer: COMMERCIAL

## 2025-08-14 VITALS
SYSTOLIC BLOOD PRESSURE: 105 MMHG | TEMPERATURE: 97.4 F | OXYGEN SATURATION: 98 % | DIASTOLIC BLOOD PRESSURE: 77 MMHG | BODY MASS INDEX: 22.95 KG/M2 | HEIGHT: 67 IN | HEART RATE: 67 BPM | WEIGHT: 146.2 LBS

## 2025-08-14 DIAGNOSIS — Z13.220 SCREENING, LIPID: ICD-10-CM

## 2025-08-14 DIAGNOSIS — R00.2 PALPITATIONS: Primary | ICD-10-CM

## 2025-08-14 DIAGNOSIS — Z13.1 SCREENING FOR DIABETES MELLITUS: ICD-10-CM

## 2025-08-14 DIAGNOSIS — R00.2 PALPITATIONS: ICD-10-CM

## 2025-08-14 LAB
ALBUMIN SERPL-MCNC: 4.8 G/DL (ref 3.5–5.2)
ALBUMIN/GLOB SERPL: 1.7 G/DL
ALP SERPL-CCNC: 69 U/L (ref 39–117)
ALT SERPL W P-5'-P-CCNC: 15 U/L (ref 1–41)
ANION GAP SERPL CALCULATED.3IONS-SCNC: 10.6 MMOL/L (ref 5–15)
AST SERPL-CCNC: 19 U/L (ref 1–40)
BASOPHILS # BLD AUTO: 0.04 10*3/MM3 (ref 0–0.2)
BASOPHILS NFR BLD AUTO: 0.8 % (ref 0–1.5)
BILIRUB SERPL-MCNC: 1.1 MG/DL (ref 0–1.2)
BUN SERPL-MCNC: 17 MG/DL (ref 6–20)
BUN/CREAT SERPL: 14.9 (ref 7–25)
CALCIUM SPEC-SCNC: 9.5 MG/DL (ref 8.6–10.5)
CHLORIDE SERPL-SCNC: 101 MMOL/L (ref 98–107)
CHOLEST SERPL-MCNC: 162 MG/DL (ref 0–200)
CO2 SERPL-SCNC: 27.4 MMOL/L (ref 22–29)
CREAT SERPL-MCNC: 1.14 MG/DL (ref 0.76–1.27)
DEPRECATED RDW RBC AUTO: 38.1 FL (ref 37–54)
EGFRCR SERPLBLD CKD-EPI 2021: 86 ML/MIN/1.73
EOSINOPHIL # BLD AUTO: 0.06 10*3/MM3 (ref 0–0.4)
EOSINOPHIL NFR BLD AUTO: 1.2 % (ref 0.3–6.2)
ERYTHROCYTE [DISTWIDTH] IN BLOOD BY AUTOMATED COUNT: 12 % (ref 12.3–15.4)
GLOBULIN UR ELPH-MCNC: 2.9 GM/DL
GLUCOSE SERPL-MCNC: 93 MG/DL (ref 65–99)
HBA1C MFR BLD: 4.8 % (ref 4.8–5.6)
HCT VFR BLD AUTO: 47.3 % (ref 37.5–51)
HDLC SERPL-MCNC: 38 MG/DL (ref 40–60)
HGB BLD-MCNC: 17 G/DL (ref 13–17.7)
IMM GRANULOCYTES # BLD AUTO: 0.01 10*3/MM3 (ref 0–0.05)
IMM GRANULOCYTES NFR BLD AUTO: 0.2 % (ref 0–0.5)
LDLC SERPL CALC-MCNC: 102 MG/DL (ref 0–100)
LDLC/HDLC SERPL: 2.63 {RATIO}
LYMPHOCYTES # BLD AUTO: 1.47 10*3/MM3 (ref 0.7–3.1)
LYMPHOCYTES NFR BLD AUTO: 28.5 % (ref 19.6–45.3)
MAGNESIUM SERPL-MCNC: 2 MG/DL (ref 1.6–2.6)
MCH RBC QN AUTO: 30.9 PG (ref 26.6–33)
MCHC RBC AUTO-ENTMCNC: 35.9 G/DL (ref 31.5–35.7)
MCV RBC AUTO: 85.8 FL (ref 79–97)
MONOCYTES # BLD AUTO: 0.34 10*3/MM3 (ref 0.1–0.9)
MONOCYTES NFR BLD AUTO: 6.6 % (ref 5–12)
NEUTROPHILS NFR BLD AUTO: 3.23 10*3/MM3 (ref 1.7–7)
NEUTROPHILS NFR BLD AUTO: 62.7 % (ref 42.7–76)
PLATELET # BLD AUTO: 195 10*3/MM3 (ref 140–450)
PMV BLD AUTO: 9.5 FL (ref 6–12)
POTASSIUM SERPL-SCNC: 4.5 MMOL/L (ref 3.5–5.2)
PROT SERPL-MCNC: 7.7 G/DL (ref 6–8.5)
RBC # BLD AUTO: 5.51 10*6/MM3 (ref 4.14–5.8)
SODIUM SERPL-SCNC: 139 MMOL/L (ref 136–145)
TRIGL SERPL-MCNC: 120 MG/DL (ref 0–150)
TSH SERPL DL<=0.05 MIU/L-ACNC: 1.82 UIU/ML (ref 0.27–4.2)
VLDLC SERPL-MCNC: 22 MG/DL (ref 5–40)
WBC NRBC COR # BLD AUTO: 5.15 10*3/MM3 (ref 3.4–10.8)

## 2025-08-14 PROCEDURE — 83735 ASSAY OF MAGNESIUM: CPT

## 2025-08-14 PROCEDURE — 83036 HEMOGLOBIN GLYCOSYLATED A1C: CPT

## 2025-08-14 PROCEDURE — 36415 COLL VENOUS BLD VENIPUNCTURE: CPT

## 2025-08-14 PROCEDURE — 80050 GENERAL HEALTH PANEL: CPT

## 2025-08-14 PROCEDURE — 99214 OFFICE O/P EST MOD 30 MIN: CPT | Performed by: NURSE PRACTITIONER

## 2025-08-14 PROCEDURE — 80061 LIPID PANEL: CPT
